# Patient Record
Sex: MALE | Race: WHITE | NOT HISPANIC OR LATINO
[De-identification: names, ages, dates, MRNs, and addresses within clinical notes are randomized per-mention and may not be internally consistent; named-entity substitution may affect disease eponyms.]

---

## 2018-01-19 ENCOUNTER — TRANSCRIPTION ENCOUNTER (OUTPATIENT)
Age: 63
End: 2018-01-19

## 2018-02-06 ENCOUNTER — APPOINTMENT (OUTPATIENT)
Dept: INTERNAL MEDICINE | Facility: CLINIC | Age: 63
End: 2018-02-06
Payer: MEDICAID

## 2018-02-06 ENCOUNTER — NON-APPOINTMENT (OUTPATIENT)
Age: 63
End: 2018-02-06

## 2018-02-06 VITALS
DIASTOLIC BLOOD PRESSURE: 100 MMHG | OXYGEN SATURATION: 97 % | TEMPERATURE: 98.2 F | SYSTOLIC BLOOD PRESSURE: 160 MMHG | WEIGHT: 307 LBS | HEIGHT: 71 IN | BODY MASS INDEX: 42.98 KG/M2 | HEART RATE: 97 BPM

## 2018-02-06 VITALS — SYSTOLIC BLOOD PRESSURE: 180 MMHG | DIASTOLIC BLOOD PRESSURE: 106 MMHG

## 2018-02-06 DIAGNOSIS — Z56.0 UNEMPLOYMENT, UNSPECIFIED: ICD-10-CM

## 2018-02-06 DIAGNOSIS — Z86.79 PERSONAL HISTORY OF OTHER DISEASES OF THE CIRCULATORY SYSTEM: ICD-10-CM

## 2018-02-06 DIAGNOSIS — Z83.3 FAMILY HISTORY OF DIABETES MELLITUS: ICD-10-CM

## 2018-02-06 DIAGNOSIS — Z11.3 ENCOUNTER FOR SCREENING FOR INFECTIONS WITH A PREDOMINANTLY SEXUAL MODE OF TRANSMISSION: ICD-10-CM

## 2018-02-06 DIAGNOSIS — Z82.49 FAMILY HISTORY OF ISCHEMIC HEART DISEASE AND OTHER DISEASES OF THE CIRCULATORY SYSTEM: ICD-10-CM

## 2018-02-06 DIAGNOSIS — L30.9 DERMATITIS, UNSPECIFIED: ICD-10-CM

## 2018-02-06 PROCEDURE — 93000 ELECTROCARDIOGRAM COMPLETE: CPT

## 2018-02-06 PROCEDURE — 36415 COLL VENOUS BLD VENIPUNCTURE: CPT

## 2018-02-06 PROCEDURE — 99386 PREV VISIT NEW AGE 40-64: CPT | Mod: 25

## 2018-02-06 SDOH — ECONOMIC STABILITY - INCOME SECURITY: UNEMPLOYMENT, UNSPECIFIED: Z56.0

## 2018-02-07 ENCOUNTER — TRANSCRIPTION ENCOUNTER (OUTPATIENT)
Age: 63
End: 2018-02-07

## 2018-02-21 ENCOUNTER — APPOINTMENT (OUTPATIENT)
Dept: INTERNAL MEDICINE | Facility: CLINIC | Age: 63
End: 2018-02-21
Payer: MEDICAID

## 2018-02-21 VITALS — SYSTOLIC BLOOD PRESSURE: 132 MMHG | DIASTOLIC BLOOD PRESSURE: 82 MMHG

## 2018-02-21 VITALS
TEMPERATURE: 100.7 F | SYSTOLIC BLOOD PRESSURE: 110 MMHG | OXYGEN SATURATION: 96 % | BODY MASS INDEX: 42.98 KG/M2 | DIASTOLIC BLOOD PRESSURE: 80 MMHG | HEIGHT: 71 IN | HEART RATE: 106 BPM | WEIGHT: 307 LBS

## 2018-02-21 LAB
ALBUMIN SERPL ELPH-MCNC: 4.2 G/DL
ALP BLD-CCNC: 66 U/L
ALT SERPL-CCNC: 26 U/L
ANION GAP SERPL CALC-SCNC: 19 MMOL/L
APPEARANCE: CLEAR
AST SERPL-CCNC: 29 U/L
BASOPHILS # BLD AUTO: 0.03 K/UL
BASOPHILS NFR BLD AUTO: 0.3 %
BILIRUB SERPL-MCNC: 0.5 MG/DL
BILIRUBIN URINE: NEGATIVE
BLOOD URINE: NEGATIVE
BUN SERPL-MCNC: 14 MG/DL
C TRACH RRNA SPEC QL NAA+PROBE: NOT DETECTED
CALCIUM SERPL-MCNC: 9.6 MG/DL
CHLORIDE SERPL-SCNC: 103 MMOL/L
CHOLEST SERPL-MCNC: 230 MG/DL
CHOLEST/HDLC SERPL: 6.6 RATIO
CO2 SERPL-SCNC: 19 MMOL/L
COLOR: YELLOW
CREAT SERPL-MCNC: 1.13 MG/DL
EOSINOPHIL # BLD AUTO: 0.12 K/UL
EOSINOPHIL NFR BLD AUTO: 1.2 %
GLUCOSE QUALITATIVE U: NEGATIVE MG/DL
GLUCOSE SERPL-MCNC: 124 MG/DL
HBA1C MFR BLD HPLC: 6.6 %
HCT VFR BLD CALC: 46.5 %
HCV AB SER QL: NONREACTIVE
HCV S/CO RATIO: 0.1 S/CO
HDLC SERPL-MCNC: 35 MG/DL
HGB BLD-MCNC: 15 G/DL
HIV1+2 AB SPEC QL IA.RAPID: NONREACTIVE
IMM GRANULOCYTES NFR BLD AUTO: 0.3 %
KETONES URINE: ABNORMAL
LDLC SERPL CALC-MCNC: 170 MG/DL
LEUKOCYTE ESTERASE URINE: NEGATIVE
LYMPHOCYTES # BLD AUTO: 3.09 K/UL
LYMPHOCYTES NFR BLD AUTO: 31.6 %
MAN DIFF?: NORMAL
MCHC RBC-ENTMCNC: 27.8 PG
MCHC RBC-ENTMCNC: 32.3 GM/DL
MCV RBC AUTO: 86.3 FL
MONOCYTES # BLD AUTO: 0.69 K/UL
MONOCYTES NFR BLD AUTO: 7 %
N GONORRHOEA RRNA SPEC QL NAA+PROBE: NOT DETECTED
NEUTROPHILS # BLD AUTO: 5.83 K/UL
NEUTROPHILS NFR BLD AUTO: 59.6 %
NITRITE URINE: NEGATIVE
PH URINE: 5
PLATELET # BLD AUTO: 394 K/UL
POTASSIUM SERPL-SCNC: 4.2 MMOL/L
PROT SERPL-MCNC: 7.5 G/DL
PROTEIN URINE: NEGATIVE MG/DL
PSA SERPL-MCNC: 9.17 NG/ML
RBC # BLD: 5.39 M/UL
RBC # FLD: 14.6 %
SODIUM SERPL-SCNC: 141 MMOL/L
SOURCE AMPLIFICATION: NORMAL
SPECIFIC GRAVITY URINE: 1.02
T PALLIDUM AB SER QL IA: NEGATIVE
T4 FREE SERPL-MCNC: 1.3 NG/DL
TRIGL SERPL-MCNC: 123 MG/DL
TSH SERPL-ACNC: 3.28 UIU/ML
UROBILINOGEN URINE: 1 MG/DL
WBC # FLD AUTO: 9.79 K/UL

## 2018-02-21 PROCEDURE — 99214 OFFICE O/P EST MOD 30 MIN: CPT

## 2018-02-22 LAB
FLUAV H1 2009 PAND RNA SPEC QL NAA+PROBE: DETECTED
RAPID RVP RESULT: DETECTED

## 2018-04-19 ENCOUNTER — APPOINTMENT (OUTPATIENT)
Dept: UROLOGY | Facility: CLINIC | Age: 63
End: 2018-04-19

## 2018-04-23 ENCOUNTER — APPOINTMENT (OUTPATIENT)
Dept: INTERNAL MEDICINE | Facility: CLINIC | Age: 63
End: 2018-04-23
Payer: MEDICAID

## 2018-04-23 VITALS — DIASTOLIC BLOOD PRESSURE: 100 MMHG | SYSTOLIC BLOOD PRESSURE: 146 MMHG

## 2018-04-23 VITALS
SYSTOLIC BLOOD PRESSURE: 140 MMHG | HEIGHT: 70.5 IN | HEART RATE: 107 BPM | DIASTOLIC BLOOD PRESSURE: 100 MMHG | WEIGHT: 300 LBS | OXYGEN SATURATION: 98 % | BODY MASS INDEX: 42.47 KG/M2 | TEMPERATURE: 98.3 F

## 2018-04-23 PROCEDURE — 99214 OFFICE O/P EST MOD 30 MIN: CPT | Mod: 25

## 2018-04-23 PROCEDURE — 36415 COLL VENOUS BLD VENIPUNCTURE: CPT

## 2018-04-23 RX ORDER — OSELTAMIVIR PHOSPHATE 75 MG/1
75 CAPSULE ORAL TWICE DAILY
Qty: 10 | Refills: 0 | Status: DISCONTINUED | COMMUNITY
Start: 2018-02-22 | End: 2018-04-23

## 2018-04-24 LAB
ALBUMIN SERPL ELPH-MCNC: 4.2 G/DL
ALP BLD-CCNC: 70 U/L
ALT SERPL-CCNC: 18 U/L
ANION GAP SERPL CALC-SCNC: 18 MMOL/L
AST SERPL-CCNC: 26 U/L
BILIRUB SERPL-MCNC: 0.4 MG/DL
BUN SERPL-MCNC: 20 MG/DL
CALCIUM SERPL-MCNC: 10 MG/DL
CHLORIDE SERPL-SCNC: 98 MMOL/L
CO2 SERPL-SCNC: 23 MMOL/L
CREAT SERPL-MCNC: 1.13 MG/DL
GLUCOSE SERPL-MCNC: 94 MG/DL
POTASSIUM SERPL-SCNC: 4.2 MMOL/L
PROT SERPL-MCNC: 7.6 G/DL
SODIUM SERPL-SCNC: 139 MMOL/L

## 2018-05-01 ENCOUNTER — APPOINTMENT (OUTPATIENT)
Dept: UROLOGY | Facility: CLINIC | Age: 63
End: 2018-05-01
Payer: MEDICAID

## 2018-05-01 PROCEDURE — 99204 OFFICE O/P NEW MOD 45 MIN: CPT

## 2018-05-03 LAB
APPEARANCE: CLEAR
BACTERIA: NEGATIVE
BILIRUBIN URINE: NEGATIVE
BLOOD URINE: NEGATIVE
COLOR: YELLOW
GLUCOSE QUALITATIVE U: 100 MG/DL
HYALINE CASTS: 3 /LPF
KETONES URINE: ABNORMAL
LEUKOCYTE ESTERASE URINE: NEGATIVE
MICROSCOPIC-UA: NORMAL
NITRITE URINE: NEGATIVE
PH URINE: 5
PROTEIN URINE: 30 MG/DL
RED BLOOD CELLS URINE: 2 /HPF
SPECIFIC GRAVITY URINE: 1.02
SQUAMOUS EPITHELIAL CELLS: 3 /HPF
UROBILINOGEN URINE: NEGATIVE MG/DL
WHITE BLOOD CELLS URINE: 2 /HPF

## 2018-05-17 LAB
PSA FREE FLD-MCNC: 17.7
PSA FREE SERPL-MCNC: 1.49 NG/ML
PSA SERPL-MCNC: 8.42 NG/ML

## 2018-06-19 LAB
HBA1C MFR BLD HPLC: 6.6 %
PSA SERPL-MCNC: 8.34 NG/ML

## 2018-08-07 ENCOUNTER — APPOINTMENT (OUTPATIENT)
Dept: UROLOGY | Facility: CLINIC | Age: 63
End: 2018-08-07

## 2018-08-13 ENCOUNTER — APPOINTMENT (OUTPATIENT)
Dept: INTERNAL MEDICINE | Facility: CLINIC | Age: 63
End: 2018-08-13
Payer: MEDICAID

## 2018-08-13 VITALS
OXYGEN SATURATION: 98 % | HEART RATE: 89 BPM | DIASTOLIC BLOOD PRESSURE: 70 MMHG | TEMPERATURE: 97.9 F | SYSTOLIC BLOOD PRESSURE: 120 MMHG | HEIGHT: 70.5 IN | WEIGHT: 293 LBS | BODY MASS INDEX: 41.48 KG/M2

## 2018-08-13 DIAGNOSIS — J06.9 ACUTE UPPER RESPIRATORY INFECTION, UNSPECIFIED: ICD-10-CM

## 2018-08-13 DIAGNOSIS — J10.1 INFLUENZA DUE TO OTHER IDENTIFIED INFLUENZA VIRUS WITH OTHER RESPIRATORY MANIFESTATIONS: ICD-10-CM

## 2018-08-13 PROCEDURE — 99214 OFFICE O/P EST MOD 30 MIN: CPT

## 2018-08-13 NOTE — ASSESSMENT
[FreeTextEntry1] : discussed w pt \par \par cont current rx \par HTN now well controlled\par \par cont diet control, exercise and efforts at weight loss \par check repeat labs as below today , monitor HgBa1c \par \par cont to monitor PSA, urology f/u as scheduled \par \par RTO 4 months for routine f/u or earlier prn if any new concerns

## 2018-08-13 NOTE — PHYSICAL EXAM
[No Acute Distress] : no acute distress [Well-Appearing] : well-appearing [Normal Voice/Communication] : normal voice/communication [No JVD] : no jugular venous distention [Supple] : supple [No Respiratory Distress] : no respiratory distress  [Clear to Auscultation] : lungs were clear to auscultation bilaterally [No Accessory Muscle Use] : no accessory muscle use [Normal Rate] : normal rate  [Regular Rhythm] : with a regular rhythm [Normal S1, S2] : normal S1 and S2 [No Murmur] : no murmur heard [Grossly Normal Strength/Tone] : grossly normal strength/tone [Normal Gait] : normal gait [Normal Affect] : the affect was normal [Normal Mood] : the mood was normal [Normal Insight/Judgement] : insight and judgment were intact [de-identified] : obese

## 2018-08-13 NOTE — HISTORY OF PRESENT ILLNESS
[de-identified] : presents for f/u visit to review HTN control, weight management and IFG/mild DM. he feels well w no new concerns. he continues to achieve mild weight loss and feels much better overall, more active. \par \par HTN now well controlled on current rx \par obesity improving\par mild DM/IFG on diet control, he has changed diet significantly, due for repeat labs \par mild hyperlipidemia on diet control \par PSA elevation stable, now following w urology and plans to continue to monitor for now, due for repeat PSA

## 2018-12-10 ENCOUNTER — APPOINTMENT (OUTPATIENT)
Dept: INTERNAL MEDICINE | Facility: CLINIC | Age: 63
End: 2018-12-10
Payer: MEDICAID

## 2018-12-10 VITALS
WEIGHT: 281 LBS | TEMPERATURE: 98.1 F | SYSTOLIC BLOOD PRESSURE: 122 MMHG | HEIGHT: 71.5 IN | DIASTOLIC BLOOD PRESSURE: 84 MMHG | OXYGEN SATURATION: 98 % | BODY MASS INDEX: 38.48 KG/M2 | HEART RATE: 79 BPM

## 2018-12-10 DIAGNOSIS — Z23 ENCOUNTER FOR IMMUNIZATION: ICD-10-CM

## 2018-12-10 LAB
ALBUMIN SERPL ELPH-MCNC: 4.2 G/DL
ALP BLD-CCNC: 53 U/L
ALT SERPL-CCNC: 19 U/L
ANION GAP SERPL CALC-SCNC: 15 MMOL/L
AST SERPL-CCNC: 21 U/L
BILIRUB SERPL-MCNC: 0.5 MG/DL
BUN SERPL-MCNC: 20 MG/DL
CALCIUM SERPL-MCNC: 9.3 MG/DL
CHLORIDE SERPL-SCNC: 100 MMOL/L
CHOLEST SERPL-MCNC: 194 MG/DL
CHOLEST/HDLC SERPL: 5.7 RATIO
CO2 SERPL-SCNC: 24 MMOL/L
CREAT SERPL-MCNC: 1.04 MG/DL
GLUCOSE SERPL-MCNC: 151 MG/DL
HBA1C MFR BLD HPLC: 6.6 %
HDLC SERPL-MCNC: 34 MG/DL
LDLC SERPL CALC-MCNC: 124 MG/DL
POTASSIUM SERPL-SCNC: 4.1 MMOL/L
PROT SERPL-MCNC: 6.9 G/DL
PSA SERPL-MCNC: 10.92 NG/ML
SODIUM SERPL-SCNC: 139 MMOL/L
TRIGL SERPL-MCNC: 179 MG/DL

## 2018-12-10 PROCEDURE — G0008: CPT

## 2018-12-10 PROCEDURE — 99214 OFFICE O/P EST MOD 30 MIN: CPT | Mod: 25

## 2018-12-10 PROCEDURE — 90674 CCIIV4 VAC NO PRSV 0.5 ML IM: CPT

## 2018-12-10 PROCEDURE — 36415 COLL VENOUS BLD VENIPUNCTURE: CPT

## 2018-12-10 NOTE — PHYSICAL EXAM
[No Acute Distress] : no acute distress [Well-Appearing] : well-appearing [Normal Voice/Communication] : normal voice/communication [No Respiratory Distress] : no respiratory distress  [Clear to Auscultation] : lungs were clear to auscultation bilaterally [No Accessory Muscle Use] : no accessory muscle use [Normal Rate] : normal rate  [Regular Rhythm] : with a regular rhythm [Normal S1, S2] : normal S1 and S2 [No Murmur] : no murmur heard [No Carotid Bruits] : no carotid bruits [Pedal Pulses Present] : the pedal pulses are present [No Edema] : there was no peripheral edema [Grossly Normal Strength/Tone] : grossly normal strength/tone [Normal Gait] : normal gait [Normal Affect] : the affect was normal [Normal Mood] : the mood was normal [Normal Insight/Judgement] : insight and judgment were intact [de-identified] : obese

## 2018-12-10 NOTE — ASSESSMENT
[FreeTextEntry1] : discussed w pt \par \par cont diet control, exercise, low carb intake, weight loss successful. monitor glycemic control. \par cont current rx for HTN which is well controlled\par \par check labs as below today \par \par monitor PSa w stable elevation, BPH. due for f/u w urology, he will make appt \par \par influenza vaccine discussed and administered today\par \par RTO 6 months for full physical or earlier prn if any new concerns

## 2018-12-10 NOTE — HISTORY OF PRESENT ILLNESS
[de-identified] : presents for f/u visit to review HTN control, weight management and IFG/mild DM. he feels well w no new concerns. he would like to discuss influenza vaccine as he had influenza B last year, has always declined the vaccine. \par \par he continues to lose weight, about 10 lbs w diet, exercise. has lost ~ 30 lbs since last year when he first came here to manage his health. \par \par HTN well controlled on current rx \par obesity improving\par mild DM/IFG on diet control stable A1c 6.6%, he has changed diet significantly\par mild hyperlipidemia on diet control , overall improved \par PSA elevation stable w BPH,  following w urology due for f/u visit and to monitor PSA

## 2018-12-11 LAB
ALBUMIN SERPL ELPH-MCNC: 4.3 G/DL
ALP BLD-CCNC: 49 U/L
ALT SERPL-CCNC: 17 U/L
ANION GAP SERPL CALC-SCNC: 14 MMOL/L
AST SERPL-CCNC: 21 U/L
BILIRUB SERPL-MCNC: 0.5 MG/DL
BUN SERPL-MCNC: 14 MG/DL
CALCIUM SERPL-MCNC: 9.5 MG/DL
CHLORIDE SERPL-SCNC: 102 MMOL/L
CHOLEST SERPL-MCNC: 177 MG/DL
CHOLEST/HDLC SERPL: 5.7 RATIO
CO2 SERPL-SCNC: 25 MMOL/L
CREAT SERPL-MCNC: 1.54 MG/DL
GLUCOSE SERPL-MCNC: 143 MG/DL
HBA1C MFR BLD HPLC: 6.4 %
HDLC SERPL-MCNC: 31 MG/DL
LDLC SERPL CALC-MCNC: 114 MG/DL
POTASSIUM SERPL-SCNC: 3.9 MMOL/L
PROT SERPL-MCNC: 7.5 G/DL
PSA SERPL-MCNC: 20.78 NG/ML
SODIUM SERPL-SCNC: 141 MMOL/L
T4 FREE SERPL-MCNC: 1.4 NG/DL
TRIGL SERPL-MCNC: 160 MG/DL
TSH SERPL-ACNC: 2.78 UIU/ML

## 2019-04-30 ENCOUNTER — APPOINTMENT (OUTPATIENT)
Dept: UROLOGY | Facility: CLINIC | Age: 64
End: 2019-04-30
Payer: MEDICAID

## 2019-04-30 PROCEDURE — 99214 OFFICE O/P EST MOD 30 MIN: CPT

## 2019-05-01 ENCOUNTER — TRANSCRIPTION ENCOUNTER (OUTPATIENT)
Age: 64
End: 2019-05-01

## 2019-05-01 LAB
APPEARANCE: ABNORMAL
BACTERIA: NEGATIVE
BILIRUBIN URINE: NEGATIVE
BLOOD URINE: NEGATIVE
COLOR: YELLOW
GLUCOSE QUALITATIVE U: NEGATIVE
HYALINE CASTS: 0 /LPF
KETONES URINE: NEGATIVE
LEUKOCYTE ESTERASE URINE: NEGATIVE
MICROSCOPIC-UA: NORMAL
NITRITE URINE: NEGATIVE
PH URINE: 6
PROTEIN URINE: ABNORMAL
PSA FREE FLD-MCNC: 23 %
PSA FREE SERPL-MCNC: 1.96 NG/ML
PSA SERPL-MCNC: 8.36 NG/ML
RED BLOOD CELLS URINE: 1 /HPF
SPECIFIC GRAVITY URINE: 1.02
SQUAMOUS EPITHELIAL CELLS: 3 /HPF
UROBILINOGEN URINE: NORMAL
WHITE BLOOD CELLS URINE: 3 /HPF

## 2019-06-03 ENCOUNTER — APPOINTMENT (OUTPATIENT)
Dept: INTERNAL MEDICINE | Facility: CLINIC | Age: 64
End: 2019-06-03
Payer: MEDICAID

## 2019-06-03 ENCOUNTER — NON-APPOINTMENT (OUTPATIENT)
Age: 64
End: 2019-06-03

## 2019-06-03 VITALS
HEART RATE: 64 BPM | DIASTOLIC BLOOD PRESSURE: 74 MMHG | SYSTOLIC BLOOD PRESSURE: 126 MMHG | OXYGEN SATURATION: 97 % | BODY MASS INDEX: 35.25 KG/M2 | HEIGHT: 70.5 IN | WEIGHT: 249 LBS | TEMPERATURE: 97.9 F

## 2019-06-03 DIAGNOSIS — Z23 ENCOUNTER FOR IMMUNIZATION: ICD-10-CM

## 2019-06-03 PROCEDURE — 90750 HZV VACC RECOMBINANT IM: CPT

## 2019-06-03 PROCEDURE — G0444 DEPRESSION SCREEN ANNUAL: CPT

## 2019-06-03 PROCEDURE — 36415 COLL VENOUS BLD VENIPUNCTURE: CPT

## 2019-06-03 PROCEDURE — 90471 IMMUNIZATION ADMIN: CPT

## 2019-06-03 PROCEDURE — 99396 PREV VISIT EST AGE 40-64: CPT | Mod: 25

## 2019-06-03 PROCEDURE — 93000 ELECTROCARDIOGRAM COMPLETE: CPT

## 2019-06-03 NOTE — REVIEW OF SYSTEMS
Focal left SFA stenosis. Three vessel tibial runoff; PT and peroneal patent to foot, minimal AT stenoses.
[Negative] : Heme/Lymph

## 2019-06-03 NOTE — HEALTH RISK ASSESSMENT
[No falls in past year] : Patient reported no falls in the past year [0] : 2) Feeling down, depressed, or hopeless: Not at all (0) [Single] : single [None] : None [] : No [ColonoscopyDate] : 2007

## 2019-06-03 NOTE — ASSESSMENT
[FreeTextEntry1] : discussed w pt \par \par check routine labs as below\par \par recent PSA reviewed, baseline range seems to be 8-9 range, following w Dr Diop, to be reevaluate in 6 months \par \par discussed further efforts at diet control, weight loss, he has been very successful thus far, cont low carb intake and exercise\par \par cont current rx for HTN\par \par discussed Shingrix vaccine in detail, first dose administered today, return for 2nd dose in 2-6 months \par \par consider repeat colonoscopy in coming year now that his HTN is under control, will review at next visit in 6 months , return earlier prn if any new concerns

## 2019-06-03 NOTE — HISTORY OF PRESENT ILLNESS
Arterial Line    Patient location during procedure: OR  Stop Time:6/7/2017 11:30 AM       Line placed for hemodynamic monitoring.  Performed By   CRNA: MARYCHUY WOODWARD  Preanesthetic Checklist  Completed: patient identified, site marked, surgical consent, pre-op evaluation, timeout performed, IV checked, risks and benefits discussed and monitors and equipment checked  Arterial Line Prep   Sterile Tech: cap, gloves and mask  Prep: ChloraPrep  Patient monitoring: blood pressure monitoring, continuous pulse oximetry and EKG  Arterial Line Procedure   Laterality:left  Location:  radial artery  Catheter size: 20 G   Guidance: landmark technique and palpation technique  Number of attempts: 2  Successful placement: yes          Post Assessment   Dressing Type: occlusive dressing applied, secured with tape and wrist guard applied.   Complications no  Circ/Move/Sens Assessment: normal.   Patient Tolerance: patient tolerated the procedure well with no apparent complications             [de-identified] : 63 y/o man presents for annual physical exam. he feels well currently , no new concerns. \par \par obesity s/p gastric bypass has improved greatly with diet control, exercise, has lost over 50 lbs since last year. he feels his back and joint pains are significantly improved. \par IFG on diet control \par HTN well controlled on current rx \par PSA elevation is monitored closely, following w Dr Diop, improved back to baseline 8-9 range after elevation. plan to continue to monitor every 6 months \par \par he recalls his last colonoscopy was around age 52 and normal as per pt, will need to consider repeat in the near future

## 2019-06-03 NOTE — COUNSELING
[Weight management counseling provided] : Weight management [Healthy eating counseling provided] : healthy eating [ - Annual Depression Screening] : Annual Depression Screening [Activity counseling provided] : activity

## 2019-06-03 NOTE — PHYSICAL EXAM
[Well Nourished] : well nourished [No Acute Distress] : no acute distress [Well Developed] : well developed [Normal Voice/Communication] : normal voice/communication [Normal Sclera/Conjunctiva] : normal sclera/conjunctiva [Well-Appearing] : well-appearing [Normal Outer Ear/Nose] : the outer ears and nose were normal in appearance [PERRL] : pupils equal round and reactive to light [Normal Oropharynx] : the oropharynx was normal [Normal TMs] : both tympanic membranes were normal [No JVD] : no jugular venous distention [Supple] : supple [No Lymphadenopathy] : no lymphadenopathy [No Respiratory Distress] : no respiratory distress  [Thyroid Normal, No Nodules] : the thyroid was normal and there were no nodules present [Clear to Auscultation] : lungs were clear to auscultation bilaterally [Regular Rhythm] : with a regular rhythm [No Accessory Muscle Use] : no accessory muscle use [Normal Rate] : normal rate  [Normal S1, S2] : normal S1 and S2 [No Carotid Bruits] : no carotid bruits [No Murmur] : no murmur heard [No Varicosities] : no varicosities [No Abdominal Bruit] : a ~M bruit was not heard ~T in the abdomen [No Edema] : there was no peripheral edema [No Extremity Clubbing/Cyanosis] : no extremity clubbing/cyanosis [Pedal Pulses Present] : the pedal pulses are present [Soft] : abdomen soft [Non Tender] : non-tender [No HSM] : no HSM [Non-distended] : non-distended [No Masses] : no abdominal mass palpated [Normal Bowel Sounds] : normal bowel sounds [Normal Supraclavicular Nodes] : no supraclavicular lymphadenopathy [Declined Rectal Exam] : declined rectal exam [Normal Posterior Cervical Nodes] : no posterior cervical lymphadenopathy [Normal Anterior Cervical Nodes] : no anterior cervical lymphadenopathy [No Joint Swelling] : no joint swelling [No Spinal Tenderness] : no spinal tenderness [Grossly Normal Strength/Tone] : grossly normal strength/tone [Normal Gait] : normal gait [No Rash] : no rash [No Focal Deficits] : no focal deficits [Coordination Grossly Intact] : coordination grossly intact [Normal Affect] : the affect was normal [Speech Grossly Normal] : speech grossly normal [Normal Insight/Judgement] : insight and judgment were intact [Normal Mood] : the mood was normal [de-identified] : obese  [FreeTextEntry1] : done recently w urologist

## 2019-08-27 ENCOUNTER — APPOINTMENT (OUTPATIENT)
Dept: INTERNAL MEDICINE | Facility: CLINIC | Age: 64
End: 2019-08-27
Payer: MEDICAID

## 2019-08-27 PROCEDURE — 90471 IMMUNIZATION ADMIN: CPT

## 2019-08-27 PROCEDURE — 90750 HZV VACC RECOMBINANT IM: CPT

## 2019-09-03 LAB
ALBUMIN SERPL ELPH-MCNC: 4.3 G/DL
ALP BLD-CCNC: 48 U/L
ALT SERPL-CCNC: 20 U/L
ANION GAP SERPL CALC-SCNC: 14 MMOL/L
APPEARANCE: CLEAR
AST SERPL-CCNC: 25 U/L
BASOPHILS # BLD AUTO: 0.05 K/UL
BASOPHILS NFR BLD AUTO: 0.5 %
BILIRUB SERPL-MCNC: 0.6 MG/DL
BILIRUBIN URINE: NEGATIVE
BLOOD URINE: NEGATIVE
BUN SERPL-MCNC: 15 MG/DL
CALCIUM SERPL-MCNC: 9.8 MG/DL
CHLORIDE SERPL-SCNC: 103 MMOL/L
CHOLEST SERPL-MCNC: 208 MG/DL
CHOLEST/HDLC SERPL: 5.9 RATIO
CO2 SERPL-SCNC: 24 MMOL/L
COLOR: YELLOW
CREAT SERPL-MCNC: 1.22 MG/DL
EOSINOPHIL # BLD AUTO: 0.13 K/UL
EOSINOPHIL NFR BLD AUTO: 1.4 %
ESTIMATED AVERAGE GLUCOSE: 137 MG/DL
GLUCOSE QUALITATIVE U: NEGATIVE
GLUCOSE SERPL-MCNC: 110 MG/DL
HBA1C MFR BLD HPLC: 6.4 %
HCT VFR BLD CALC: 49.6 %
HDLC SERPL-MCNC: 35 MG/DL
HGB BLD-MCNC: 15.4 G/DL
IMM GRANULOCYTES NFR BLD AUTO: 0.3 %
KETONES URINE: NEGATIVE
LDLC SERPL CALC-MCNC: 143 MG/DL
LEUKOCYTE ESTERASE URINE: NEGATIVE
LYMPHOCYTES # BLD AUTO: 2.52 K/UL
LYMPHOCYTES NFR BLD AUTO: 26.8 %
MAN DIFF?: NORMAL
MCHC RBC-ENTMCNC: 28.6 PG
MCHC RBC-ENTMCNC: 31 GM/DL
MCV RBC AUTO: 92.2 FL
MONOCYTES # BLD AUTO: 0.6 K/UL
MONOCYTES NFR BLD AUTO: 6.4 %
NEUTROPHILS # BLD AUTO: 6.08 K/UL
NEUTROPHILS NFR BLD AUTO: 64.6 %
NITRITE URINE: NEGATIVE
PH URINE: 6.5
PLATELET # BLD AUTO: 378 K/UL
POTASSIUM SERPL-SCNC: 4.5 MMOL/L
PROT SERPL-MCNC: 6.7 G/DL
PROTEIN URINE: NEGATIVE
RBC # BLD: 5.38 M/UL
RBC # FLD: 15.3 %
SODIUM SERPL-SCNC: 141 MMOL/L
SPECIFIC GRAVITY URINE: 1.02
T4 FREE SERPL-MCNC: 1.2 NG/DL
TRIGL SERPL-MCNC: 148 MG/DL
TSH SERPL-ACNC: 1.86 UIU/ML
UROBILINOGEN URINE: NORMAL
WBC # FLD AUTO: 9.41 K/UL

## 2019-09-26 ENCOUNTER — RX RENEWAL (OUTPATIENT)
Age: 64
End: 2019-09-26

## 2019-11-12 ENCOUNTER — APPOINTMENT (OUTPATIENT)
Dept: UROLOGY | Facility: CLINIC | Age: 64
End: 2019-11-12
Payer: MEDICAID

## 2019-11-12 DIAGNOSIS — R35.0 FREQUENCY OF MICTURITION: ICD-10-CM

## 2019-11-12 DIAGNOSIS — R97.20 ELEVATED PROSTATE, SPECIFIC ANTIGEN [PSA]: ICD-10-CM

## 2019-11-12 PROCEDURE — 99214 OFFICE O/P EST MOD 30 MIN: CPT

## 2019-11-12 NOTE — PHYSICAL EXAM
[General Appearance - Well Developed] : well developed [General Appearance - Well Nourished] : well nourished [General Appearance - In No Acute Distress] : no acute distress [Well Groomed] : well groomed [Normal Appearance] : normal appearance [Abdomen Soft] : soft [Abdomen Tenderness] : non-tender [Urethral Meatus] : meatus normal [Urinary Bladder Findings] : the bladder was normal on palpation [Costovertebral Angle Tenderness] : no ~M costovertebral angle tenderness [Testes Mass (___cm)] : there were no testicular masses [No Prostate Nodules] : no prostate nodules [Scrotum] : the scrotum was normal [Edema] : no peripheral edema [Exaggerated Use Of Accessory Muscles For Inspiration] : no accessory muscle use [] : no respiratory distress [Respiration, Rhythm And Depth] : normal respiratory rhythm and effort [Affect] : the affect was normal [Not Anxious] : not anxious [Oriented To Time, Place, And Person] : oriented to person, place, and time [Mood] : the mood was normal [No Palpable Adenopathy] : no palpable adenopathy [Normal Station and Gait] : the gait and station were normal for the patient's age [No Focal Deficits] : no focal deficits

## 2019-11-13 LAB
APPEARANCE: CLEAR
BACTERIA: NEGATIVE
BILIRUBIN URINE: NEGATIVE
BLOOD URINE: NEGATIVE
COLOR: YELLOW
GLUCOSE QUALITATIVE U: NEGATIVE
HYALINE CASTS: 2 /LPF
KETONES URINE: NEGATIVE
LEUKOCYTE ESTERASE URINE: NEGATIVE
MICROSCOPIC-UA: NORMAL
NITRITE URINE: NEGATIVE
PH URINE: 6
PROTEIN URINE: NORMAL
PSA FREE FLD-MCNC: 17 %
PSA FREE SERPL-MCNC: 1.58 NG/ML
PSA SERPL-MCNC: 9.47 NG/ML
RED BLOOD CELLS URINE: 1 /HPF
SPECIFIC GRAVITY URINE: 1.02
SQUAMOUS EPITHELIAL CELLS: 1 /HPF
UROBILINOGEN URINE: ABNORMAL
WHITE BLOOD CELLS URINE: 1 /HPF

## 2019-12-09 ENCOUNTER — APPOINTMENT (OUTPATIENT)
Dept: INTERNAL MEDICINE | Facility: CLINIC | Age: 64
End: 2019-12-09
Payer: MEDICAID

## 2019-12-09 VITALS
OXYGEN SATURATION: 98 % | WEIGHT: 236 LBS | DIASTOLIC BLOOD PRESSURE: 70 MMHG | HEART RATE: 66 BPM | HEIGHT: 70.5 IN | TEMPERATURE: 98.3 F | SYSTOLIC BLOOD PRESSURE: 110 MMHG | BODY MASS INDEX: 33.41 KG/M2

## 2019-12-09 DIAGNOSIS — R97.20 ELEVATED PROSTATE, SPECIFIC ANTIGEN [PSA]: ICD-10-CM

## 2019-12-09 PROCEDURE — 99214 OFFICE O/P EST MOD 30 MIN: CPT | Mod: 25

## 2019-12-09 PROCEDURE — G0008: CPT

## 2019-12-09 PROCEDURE — 90686 IIV4 VACC NO PRSV 0.5 ML IM: CPT

## 2019-12-09 PROCEDURE — 36415 COLL VENOUS BLD VENIPUNCTURE: CPT

## 2019-12-09 PROCEDURE — 83036 HEMOGLOBIN GLYCOSYLATED A1C: CPT | Mod: QW

## 2019-12-09 NOTE — HISTORY OF PRESENT ILLNESS
[de-identified] : presents for routine f/u visit, he feels well currently , no new concerns. continues to lose weight effectively w diet control. s/p gastric bypass in the past. exercising mildly. \par \par IFG on diet control \par HTN well controlled on current rx \par PSA elevation is monitored closely, following w Dr Diop, stable in 8-9 range, mild BPH symptoms , advised to cont monitoring

## 2019-12-09 NOTE — ASSESSMENT
[FreeTextEntry1] : discussed w pt \par \par continued weight loss w diet control, exercise, he is feeling well \par \par POC HgbA1c improved to 5.7% today \par \par cont current rx for HTN\par \par check additional labs as below\par \par influenza vaccine discussed and administered today\par \par continued urology f/u for monitoring of elevated BPH w elevated PSA \par \par RTO 6 months or earlier prn if any new concerns

## 2020-01-14 LAB
ALBUMIN SERPL ELPH-MCNC: 4.4 G/DL
ALP BLD-CCNC: 43 U/L
ALT SERPL-CCNC: 15 U/L
ANION GAP SERPL CALC-SCNC: 10 MMOL/L
AST SERPL-CCNC: 20 U/L
BILIRUB SERPL-MCNC: 0.5 MG/DL
BUN SERPL-MCNC: 18 MG/DL
CALCIUM SERPL-MCNC: 9.6 MG/DL
CHLORIDE SERPL-SCNC: 104 MMOL/L
CHOLEST SERPL-MCNC: 186 MG/DL
CHOLEST/HDLC SERPL: 3.4 RATIO
CO2 SERPL-SCNC: 27 MMOL/L
CREAT SERPL-MCNC: 1.08 MG/DL
GLUCOSE SERPL-MCNC: 107 MG/DL
HDLC SERPL-MCNC: 55 MG/DL
LDLC SERPL CALC-MCNC: 107 MG/DL
POTASSIUM SERPL-SCNC: 4.3 MMOL/L
PROT SERPL-MCNC: 6.6 G/DL
SODIUM SERPL-SCNC: 141 MMOL/L
TRIGL SERPL-MCNC: 122 MG/DL

## 2020-06-08 ENCOUNTER — RX RENEWAL (OUTPATIENT)
Age: 65
End: 2020-06-08

## 2020-07-07 ENCOUNTER — APPOINTMENT (OUTPATIENT)
Dept: UROLOGY | Facility: CLINIC | Age: 65
End: 2020-07-07

## 2020-11-04 ENCOUNTER — APPOINTMENT (OUTPATIENT)
Dept: INTERNAL MEDICINE | Facility: CLINIC | Age: 65
End: 2020-11-04

## 2021-03-21 ENCOUNTER — INPATIENT (INPATIENT)
Facility: HOSPITAL | Age: 66
LOS: 14 days | Discharge: HOME | DRG: 871 | End: 2021-04-05
Attending: HOSPITALIST | Admitting: HOSPITALIST
Payer: MEDICAID

## 2021-03-21 VITALS
RESPIRATION RATE: 25 BRPM | SYSTOLIC BLOOD PRESSURE: 116 MMHG | DIASTOLIC BLOOD PRESSURE: 78 MMHG | OXYGEN SATURATION: 84 % | HEART RATE: 87 BPM | TEMPERATURE: 98 F

## 2021-03-21 LAB — GAS PNL BLDV: SIGNIFICANT CHANGE UP

## 2021-03-21 PROCEDURE — 99285 EMERGENCY DEPT VISIT HI MDM: CPT

## 2021-03-21 PROCEDURE — 93010 ELECTROCARDIOGRAM REPORT: CPT

## 2021-03-21 RX ORDER — ACETAMINOPHEN 500 MG
975 TABLET ORAL ONCE
Refills: 0 | Status: COMPLETED | OUTPATIENT
Start: 2021-03-21 | End: 2021-03-21

## 2021-03-21 NOTE — ED ADULT NURSE NOTE - OBJECTIVE STATEMENT
Pt is a 64 y/o M, PMH HTN, presenting to ED c/o fevers/SOB/cough x 1 week. Pt states he had his first dose of the COVID vaccine on 3/3. Pt endorses fevers at home, unmeasured, but states he took Aleve when he felt himself start to get warm, last dose 1 hour PTA. Pt states his SOB is worse on exertion and at night. Pt SpO2 noted to be 84% on RA, improvement to 100% on 10L NRB. Pt noted to be tachypneic to 30RR with increased WOB and moderate diaphoresis.  Pt denies headache, dizziness, chest pain, palpitations, abdominal pain, n/v/d, urinary symptoms, chills, weakness at this time. Pt is A&Ox4, moves all extremities, changed into hospital gown on [facility] with call bell at bedside and safety maintained.

## 2021-03-21 NOTE — ED PROVIDER NOTE - OBJECTIVE STATEMENT
Hx htn on "blood pressure med" presenting w. 9 days of sore throat now w. complaint of SOB, has had one COVID injection but not yet received his second vaccination, notes that he does not have pain anywhere, just a tickle in his throat, denies other sx of concern, no hx of DM/HLD/heart disease. Denies leg swelling, back pain, chest pain, or abdominal pain.

## 2021-03-21 NOTE — ED PROVIDER NOTE - CLINICAL SUMMARY MEDICAL DECISION MAKING FREE TEXT BOX
65M presenting for evaluation of sore throat and shortness of breath, on exam hypoxic to 84% in triage and with slowly improved O2 sat on NRB, no peripheral edema, denies pain, high suspicion for COVID infx will check labs, imaging, swab, tba for hypoxic resp failure. RIC Woo

## 2021-03-21 NOTE — ED ADULT NURSE NOTE - NSIMPLEMENTINTERV_GEN_ALL_ED
Implemented All Universal Safety Interventions:  [place] to call system. Call bell, personal items and telephone within reach. Instruct patient to call for assistance. Room bathroom lighting operational. Non-slip footwear when patient is off stretcher. Physically safe environment: no spills, clutter or unnecessary equipment. Stretcher in lowest position, wheels locked, appropriate side rails in place.

## 2021-03-21 NOTE — ED PROVIDER NOTE - PHYSICAL EXAMINATION
Gen: NAD, non-toxic, conversational  Eyes: PERRLA, EOMI   HENT: Normocephalic, atraumatic. External ears normal, no rhinorrhea, moist mucous membranes.   CV: RRR, no M/R/G  Resp: CTAB, non-labored, speaking without difficulty on non-rebreather, O2 sat in triage of 84% on RA, initial sat on NRB of 88% but improved to 100%   Abd: soft, non tender, non rigid, no guarding or rebound tenderness  Back: No CVAT bilaterally, no midline ttp  Skin: dry, wwp   Neuro: AOx3, speech is fluent and appropriate  Psych: Mood "okay", affect euthymic

## 2021-03-22 DIAGNOSIS — N17.9 ACUTE KIDNEY FAILURE, UNSPECIFIED: ICD-10-CM

## 2021-03-22 DIAGNOSIS — U07.1 COVID-19: ICD-10-CM

## 2021-03-22 DIAGNOSIS — R09.89 OTHER SPECIFIED SYMPTOMS AND SIGNS INVOLVING THE CIRCULATORY AND RESPIRATORY SYSTEMS: ICD-10-CM

## 2021-03-22 DIAGNOSIS — J96.01 ACUTE RESPIRATORY FAILURE WITH HYPOXIA: ICD-10-CM

## 2021-03-22 DIAGNOSIS — I10 ESSENTIAL (PRIMARY) HYPERTENSION: ICD-10-CM

## 2021-03-22 LAB
A1C WITH ESTIMATED AVERAGE GLUCOSE RESULT: 6.2 % — HIGH (ref 4–5.6)
ALBUMIN SERPL ELPH-MCNC: 2.8 G/DL — LOW (ref 3.3–5)
ALBUMIN SERPL ELPH-MCNC: 3.2 G/DL — LOW (ref 3.3–5)
ALP SERPL-CCNC: 56 U/L — SIGNIFICANT CHANGE UP (ref 40–120)
ALP SERPL-CCNC: 67 U/L — SIGNIFICANT CHANGE UP (ref 40–120)
ALT FLD-CCNC: 11 U/L — SIGNIFICANT CHANGE UP (ref 10–45)
ALT FLD-CCNC: 14 U/L — SIGNIFICANT CHANGE UP (ref 10–45)
ANION GAP SERPL CALC-SCNC: 13 MMOL/L — SIGNIFICANT CHANGE UP (ref 5–17)
ANION GAP SERPL CALC-SCNC: 15 MMOL/L — SIGNIFICANT CHANGE UP (ref 5–17)
APTT BLD: 103.5 SEC — HIGH (ref 27.5–35.5)
APTT BLD: 29.4 SEC — SIGNIFICANT CHANGE UP (ref 27.5–35.5)
APTT BLD: 49.5 SEC — HIGH (ref 27.5–35.5)
APTT BLD: 76.5 SEC — HIGH (ref 27.5–35.5)
AST SERPL-CCNC: 28 U/L — SIGNIFICANT CHANGE UP (ref 10–40)
AST SERPL-CCNC: 35 U/L — SIGNIFICANT CHANGE UP (ref 10–40)
BASE EXCESS BLDV CALC-SCNC: 1.9 MMOL/L — SIGNIFICANT CHANGE UP (ref -2–2)
BASOPHILS # BLD AUTO: 0.04 K/UL — SIGNIFICANT CHANGE UP (ref 0–0.2)
BASOPHILS # BLD AUTO: 0.04 K/UL — SIGNIFICANT CHANGE UP (ref 0–0.2)
BASOPHILS NFR BLD AUTO: 0.4 % — SIGNIFICANT CHANGE UP (ref 0–2)
BASOPHILS NFR BLD AUTO: 0.4 % — SIGNIFICANT CHANGE UP (ref 0–2)
BILIRUB SERPL-MCNC: 0.5 MG/DL — SIGNIFICANT CHANGE UP (ref 0.2–1.2)
BILIRUB SERPL-MCNC: 0.6 MG/DL — SIGNIFICANT CHANGE UP (ref 0.2–1.2)
BUN SERPL-MCNC: 24 MG/DL — HIGH (ref 7–23)
BUN SERPL-MCNC: 24 MG/DL — HIGH (ref 7–23)
CA-I SERPL-SCNC: 1.12 MMOL/L — SIGNIFICANT CHANGE UP (ref 1.12–1.3)
CALCIUM SERPL-MCNC: 8.3 MG/DL — LOW (ref 8.4–10.5)
CALCIUM SERPL-MCNC: 8.9 MG/DL — SIGNIFICANT CHANGE UP (ref 8.4–10.5)
CHLORIDE BLDV-SCNC: 102 MMOL/L — SIGNIFICANT CHANGE UP (ref 96–108)
CHLORIDE SERPL-SCNC: 100 MMOL/L — SIGNIFICANT CHANGE UP (ref 96–108)
CHLORIDE SERPL-SCNC: 99 MMOL/L — SIGNIFICANT CHANGE UP (ref 96–108)
CK SERPL-CCNC: 136 U/L — SIGNIFICANT CHANGE UP (ref 30–200)
CK SERPL-CCNC: 174 U/L — SIGNIFICANT CHANGE UP (ref 30–200)
CO2 BLDV-SCNC: 29 MMOL/L — SIGNIFICANT CHANGE UP (ref 22–30)
CO2 SERPL-SCNC: 24 MMOL/L — SIGNIFICANT CHANGE UP (ref 22–31)
CO2 SERPL-SCNC: 25 MMOL/L — SIGNIFICANT CHANGE UP (ref 22–31)
CREAT SERPL-MCNC: 1.57 MG/DL — HIGH (ref 0.5–1.3)
CREAT SERPL-MCNC: 1.82 MG/DL — HIGH (ref 0.5–1.3)
CRP SERPL-MCNC: 20.07 MG/DL — HIGH (ref 0–0.4)
D DIMER BLD IA.RAPID-MCNC: 967 NG/ML DDU — HIGH
EOSINOPHIL # BLD AUTO: 0.04 K/UL — SIGNIFICANT CHANGE UP (ref 0–0.5)
EOSINOPHIL # BLD AUTO: 0.13 K/UL — SIGNIFICANT CHANGE UP (ref 0–0.5)
EOSINOPHIL NFR BLD AUTO: 0.4 % — SIGNIFICANT CHANGE UP (ref 0–6)
EOSINOPHIL NFR BLD AUTO: 1.2 % — SIGNIFICANT CHANGE UP (ref 0–6)
ESTIMATED AVERAGE GLUCOSE: 131 MG/DL — HIGH (ref 68–114)
FERRITIN SERPL-MCNC: 932 NG/ML — HIGH (ref 30–400)
GAS PNL BLDV: 136 MMOL/L — SIGNIFICANT CHANGE UP (ref 135–145)
GAS PNL BLDV: SIGNIFICANT CHANGE UP
GLUCOSE BLDV-MCNC: 159 MG/DL — HIGH (ref 70–99)
GLUCOSE SERPL-MCNC: 149 MG/DL — HIGH (ref 70–99)
GLUCOSE SERPL-MCNC: 154 MG/DL — HIGH (ref 70–99)
HCO3 BLDV-SCNC: 27 MMOL/L — SIGNIFICANT CHANGE UP (ref 21–29)
HCT VFR BLD CALC: 41.9 % — SIGNIFICANT CHANGE UP (ref 39–50)
HCT VFR BLD CALC: 45.2 % — SIGNIFICANT CHANGE UP (ref 39–50)
HCT VFR BLD CALC: 45.4 % — SIGNIFICANT CHANGE UP (ref 39–50)
HCT VFR BLDA CALC: 46 % — SIGNIFICANT CHANGE UP (ref 39–50)
HCV AB S/CO SERPL IA: 0.09 S/CO — SIGNIFICANT CHANGE UP (ref 0–0.99)
HCV AB SERPL-IMP: SIGNIFICANT CHANGE UP
HGB BLD CALC-MCNC: 15 G/DL — SIGNIFICANT CHANGE UP (ref 13–17)
HGB BLD-MCNC: 13.7 G/DL — SIGNIFICANT CHANGE UP (ref 13–17)
HGB BLD-MCNC: 14.8 G/DL — SIGNIFICANT CHANGE UP (ref 13–17)
HGB BLD-MCNC: 14.9 G/DL — SIGNIFICANT CHANGE UP (ref 13–17)
IMM GRANULOCYTES NFR BLD AUTO: 1.5 % — SIGNIFICANT CHANGE UP (ref 0–1.5)
IMM GRANULOCYTES NFR BLD AUTO: 1.5 % — SIGNIFICANT CHANGE UP (ref 0–1.5)
INR BLD: 1.27 RATIO — HIGH (ref 0.88–1.16)
LACTATE BLDV-MCNC: 2.5 MMOL/L — HIGH (ref 0.7–2)
LDH SERPL L TO P-CCNC: 478 U/L — HIGH (ref 50–242)
LYMPHOCYTES # BLD AUTO: 0.81 K/UL — LOW (ref 1–3.3)
LYMPHOCYTES # BLD AUTO: 2.22 K/UL — SIGNIFICANT CHANGE UP (ref 1–3.3)
LYMPHOCYTES # BLD AUTO: 20.4 % — SIGNIFICANT CHANGE UP (ref 13–44)
LYMPHOCYTES # BLD AUTO: 7.8 % — LOW (ref 13–44)
MAGNESIUM SERPL-MCNC: 2.2 MG/DL — SIGNIFICANT CHANGE UP (ref 1.6–2.6)
MCHC RBC-ENTMCNC: 28.3 PG — SIGNIFICANT CHANGE UP (ref 27–34)
MCHC RBC-ENTMCNC: 28.4 PG — SIGNIFICANT CHANGE UP (ref 27–34)
MCHC RBC-ENTMCNC: 28.8 PG — SIGNIFICANT CHANGE UP (ref 27–34)
MCHC RBC-ENTMCNC: 32.6 GM/DL — SIGNIFICANT CHANGE UP (ref 32–36)
MCHC RBC-ENTMCNC: 32.7 GM/DL — SIGNIFICANT CHANGE UP (ref 32–36)
MCHC RBC-ENTMCNC: 33 GM/DL — SIGNIFICANT CHANGE UP (ref 32–36)
MCV RBC AUTO: 86.6 FL — SIGNIFICANT CHANGE UP (ref 80–100)
MCV RBC AUTO: 87 FL — SIGNIFICANT CHANGE UP (ref 80–100)
MCV RBC AUTO: 87.4 FL — SIGNIFICANT CHANGE UP (ref 80–100)
MONOCYTES # BLD AUTO: 0.76 K/UL — SIGNIFICANT CHANGE UP (ref 0–0.9)
MONOCYTES # BLD AUTO: 0.96 K/UL — HIGH (ref 0–0.9)
MONOCYTES NFR BLD AUTO: 7.3 % — SIGNIFICANT CHANGE UP (ref 2–14)
MONOCYTES NFR BLD AUTO: 8.8 % — SIGNIFICANT CHANGE UP (ref 2–14)
NEUTROPHILS # BLD AUTO: 7.38 K/UL — SIGNIFICANT CHANGE UP (ref 1.8–7.4)
NEUTROPHILS # BLD AUTO: 8.58 K/UL — HIGH (ref 1.8–7.4)
NEUTROPHILS NFR BLD AUTO: 67.7 % — SIGNIFICANT CHANGE UP (ref 43–77)
NEUTROPHILS NFR BLD AUTO: 82.6 % — HIGH (ref 43–77)
NRBC # BLD: 0 /100 WBCS — SIGNIFICANT CHANGE UP (ref 0–0)
NT-PROBNP SERPL-SCNC: 451 PG/ML — HIGH (ref 0–300)
PCO2 BLDV: 47 MMHG — SIGNIFICANT CHANGE UP (ref 35–50)
PH BLDV: 7.38 — SIGNIFICANT CHANGE UP (ref 7.35–7.45)
PHOSPHATE SERPL-MCNC: 2.8 MG/DL — SIGNIFICANT CHANGE UP (ref 2.5–4.5)
PLATELET # BLD AUTO: 435 K/UL — HIGH (ref 150–400)
PLATELET # BLD AUTO: 477 K/UL — HIGH (ref 150–400)
PLATELET # BLD AUTO: 515 K/UL — HIGH (ref 150–400)
PO2 BLDV: 29 MMHG — SIGNIFICANT CHANGE UP (ref 25–45)
POTASSIUM BLDV-SCNC: 3.4 MMOL/L — LOW (ref 3.5–5.3)
POTASSIUM SERPL-MCNC: 3.7 MMOL/L — SIGNIFICANT CHANGE UP (ref 3.5–5.3)
POTASSIUM SERPL-MCNC: 3.9 MMOL/L — SIGNIFICANT CHANGE UP (ref 3.5–5.3)
POTASSIUM SERPL-SCNC: 3.7 MMOL/L — SIGNIFICANT CHANGE UP (ref 3.5–5.3)
POTASSIUM SERPL-SCNC: 3.9 MMOL/L — SIGNIFICANT CHANGE UP (ref 3.5–5.3)
PROCALCITONIN SERPL-MCNC: 0.12 NG/ML — HIGH (ref 0.02–0.1)
PROT SERPL-MCNC: 6.5 G/DL — SIGNIFICANT CHANGE UP (ref 6–8.3)
PROT SERPL-MCNC: 7.5 G/DL — SIGNIFICANT CHANGE UP (ref 6–8.3)
PROTHROM AB SERPL-ACNC: 15.1 SEC — HIGH (ref 10.6–13.6)
RBC # BLD: 4.84 M/UL — SIGNIFICANT CHANGE UP (ref 4.2–5.8)
RBC # BLD: 5.17 M/UL — SIGNIFICANT CHANGE UP (ref 4.2–5.8)
RBC # BLD: 5.22 M/UL — SIGNIFICANT CHANGE UP (ref 4.2–5.8)
RBC # FLD: 13.8 % — SIGNIFICANT CHANGE UP (ref 10.3–14.5)
RBC # FLD: 13.9 % — SIGNIFICANT CHANGE UP (ref 10.3–14.5)
RBC # FLD: 14.1 % — SIGNIFICANT CHANGE UP (ref 10.3–14.5)
SAO2 % BLDV: 49 % — LOW (ref 67–88)
SARS-COV-2 RNA SPEC QL NAA+PROBE: DETECTED
SODIUM SERPL-SCNC: 136 MMOL/L — SIGNIFICANT CHANGE UP (ref 135–145)
SODIUM SERPL-SCNC: 140 MMOL/L — SIGNIFICANT CHANGE UP (ref 135–145)
TROPONIN T, HIGH SENSITIVITY RESULT: 11 NG/L — SIGNIFICANT CHANGE UP (ref 0–51)
TSH SERPL-MCNC: 0.64 UIU/ML — SIGNIFICANT CHANGE UP (ref 0.27–4.2)
WBC # BLD: 10.39 K/UL — SIGNIFICANT CHANGE UP (ref 3.8–10.5)
WBC # BLD: 10.89 K/UL — HIGH (ref 3.8–10.5)
WBC # BLD: 7.68 K/UL — SIGNIFICANT CHANGE UP (ref 3.8–10.5)
WBC # FLD AUTO: 10.39 K/UL — SIGNIFICANT CHANGE UP (ref 3.8–10.5)
WBC # FLD AUTO: 10.89 K/UL — HIGH (ref 3.8–10.5)
WBC # FLD AUTO: 7.68 K/UL — SIGNIFICANT CHANGE UP (ref 3.8–10.5)

## 2021-03-22 PROCEDURE — 12345: CPT | Mod: NC

## 2021-03-22 PROCEDURE — 99223 1ST HOSP IP/OBS HIGH 75: CPT

## 2021-03-22 PROCEDURE — 71045 X-RAY EXAM CHEST 1 VIEW: CPT | Mod: 26

## 2021-03-22 RX ORDER — HEPARIN SODIUM 5000 [USP'U]/ML
10000 INJECTION INTRAVENOUS; SUBCUTANEOUS ONCE
Refills: 0 | Status: COMPLETED | OUTPATIENT
Start: 2021-03-22 | End: 2021-03-22

## 2021-03-22 RX ORDER — HEPARIN SODIUM 5000 [USP'U]/ML
8000 INJECTION INTRAVENOUS; SUBCUTANEOUS ONCE
Refills: 0 | Status: DISCONTINUED | OUTPATIENT
Start: 2021-03-22 | End: 2021-03-22

## 2021-03-22 RX ORDER — INFLUENZA VIRUS VACCINE 15; 15; 15; 15 UG/.5ML; UG/.5ML; UG/.5ML; UG/.5ML
0.5 SUSPENSION INTRAMUSCULAR ONCE
Refills: 0 | Status: DISCONTINUED | OUTPATIENT
Start: 2021-03-22 | End: 2021-04-05

## 2021-03-22 RX ORDER — REMDESIVIR 5 MG/ML
INJECTION INTRAVENOUS
Refills: 0 | Status: COMPLETED | OUTPATIENT
Start: 2021-03-22 | End: 2021-03-26

## 2021-03-22 RX ORDER — HEPARIN SODIUM 5000 [USP'U]/ML
8000 INJECTION INTRAVENOUS; SUBCUTANEOUS EVERY 6 HOURS
Refills: 0 | Status: DISCONTINUED | OUTPATIENT
Start: 2021-03-22 | End: 2021-03-22

## 2021-03-22 RX ORDER — HEPARIN SODIUM 5000 [USP'U]/ML
5000 INJECTION INTRAVENOUS; SUBCUTANEOUS EVERY 6 HOURS
Refills: 0 | Status: DISCONTINUED | OUTPATIENT
Start: 2021-03-22 | End: 2021-03-25

## 2021-03-22 RX ORDER — HEPARIN SODIUM 5000 [USP'U]/ML
10000 INJECTION INTRAVENOUS; SUBCUTANEOUS EVERY 6 HOURS
Refills: 0 | Status: DISCONTINUED | OUTPATIENT
Start: 2021-03-22 | End: 2021-03-25

## 2021-03-22 RX ORDER — REMDESIVIR 5 MG/ML
100 INJECTION INTRAVENOUS EVERY 24 HOURS
Refills: 0 | Status: COMPLETED | OUTPATIENT
Start: 2021-03-23 | End: 2021-03-26

## 2021-03-22 RX ORDER — REMDESIVIR 5 MG/ML
200 INJECTION INTRAVENOUS EVERY 24 HOURS
Refills: 0 | Status: COMPLETED | OUTPATIENT
Start: 2021-03-22 | End: 2021-03-22

## 2021-03-22 RX ORDER — HEPARIN SODIUM 5000 [USP'U]/ML
4000 INJECTION INTRAVENOUS; SUBCUTANEOUS EVERY 6 HOURS
Refills: 0 | Status: DISCONTINUED | OUTPATIENT
Start: 2021-03-22 | End: 2021-03-22

## 2021-03-22 RX ORDER — DEXAMETHASONE 0.5 MG/5ML
6 ELIXIR ORAL ONCE
Refills: 0 | Status: COMPLETED | OUTPATIENT
Start: 2021-03-22 | End: 2021-03-22

## 2021-03-22 RX ORDER — HEPARIN SODIUM 5000 [USP'U]/ML
INJECTION INTRAVENOUS; SUBCUTANEOUS
Qty: 25000 | Refills: 0 | Status: DISCONTINUED | OUTPATIENT
Start: 2021-03-22 | End: 2021-03-25

## 2021-03-22 RX ORDER — SODIUM CHLORIDE 9 MG/ML
1000 INJECTION INTRAMUSCULAR; INTRAVENOUS; SUBCUTANEOUS
Refills: 0 | Status: DISCONTINUED | OUTPATIENT
Start: 2021-03-22 | End: 2021-03-22

## 2021-03-22 RX ORDER — DEXAMETHASONE 0.5 MG/5ML
6 ELIXIR ORAL DAILY
Refills: 0 | Status: DISCONTINUED | OUTPATIENT
Start: 2021-03-22 | End: 2021-03-31

## 2021-03-22 RX ORDER — HEPARIN SODIUM 5000 [USP'U]/ML
INJECTION INTRAVENOUS; SUBCUTANEOUS
Qty: 25000 | Refills: 0 | Status: DISCONTINUED | OUTPATIENT
Start: 2021-03-22 | End: 2021-03-22

## 2021-03-22 RX ADMIN — HEPARIN SODIUM 2200 UNIT(S)/HR: 5000 INJECTION INTRAVENOUS; SUBCUTANEOUS at 23:25

## 2021-03-22 RX ADMIN — Medication 6 MILLIGRAM(S): at 06:32

## 2021-03-22 RX ADMIN — Medication 6 MILLIGRAM(S): at 01:01

## 2021-03-22 RX ADMIN — HEPARIN SODIUM 10000 UNIT(S): 5000 INJECTION INTRAVENOUS; SUBCUTANEOUS at 03:19

## 2021-03-22 RX ADMIN — HEPARIN SODIUM 1900 UNIT(S)/HR: 5000 INJECTION INTRAVENOUS; SUBCUTANEOUS at 10:17

## 2021-03-22 RX ADMIN — HEPARIN SODIUM 2200 UNIT(S)/HR: 5000 INJECTION INTRAVENOUS; SUBCUTANEOUS at 16:51

## 2021-03-22 RX ADMIN — REMDESIVIR 500 MILLIGRAM(S): 5 INJECTION INTRAVENOUS at 11:13

## 2021-03-22 RX ADMIN — HEPARIN SODIUM 5000 UNIT(S): 5000 INJECTION INTRAVENOUS; SUBCUTANEOUS at 16:55

## 2021-03-22 RX ADMIN — HEPARIN SODIUM 2200 UNIT(S)/HR: 5000 INJECTION INTRAVENOUS; SUBCUTANEOUS at 03:20

## 2021-03-22 NOTE — H&P ADULT - NSHPPHYSICALEXAM_GEN_ALL_CORE
PHYSICAL EXAM:   GENERAL: Alert. Not confused. No acute distress. Not thin. Not cachectic. +obese.  HEAD:  Atraumatic. Normocephalic.  EYES: EOMI. PERRLA. Normal conjunctiva/sclera.  ENT: Neck supple. No JVD. Moist oral mucosa. Not edentulous. No thrush.  LYMPH: Normal supraclavicular/cervical lymph nodes.   CARDIAC: +tachy, Not nat. Regular rhythm. Not irregularly irregular. S1. S2.   LUNG/CHEST: BS equal bilaterally. No wheezes. +rales. +rhonchi.  ABDOMEN: Soft. No tenderness. No distension. No fluid wave. Normal bowel sounds.  BACK: No midline/vertebral tenderness. No flank tenderness.  VASCULAR: +2 b/l radial or ulnar pulses. Palpable DP pulses.  EXTREMITIES:  No clubbing. No cyanosis. No edema. Moving all 4.  NEUROLOGY: A&Ox3. Non-focal exam. Cranial nerves intact. Normal speech. Sensation intact.  PSYCH: Normal behavior. Normal affect.  SKIN: No jaundice. No erythema. No rash/lesion.  Vascular Access:     ICU Vital Signs Last 24 Hrs  T(C): 36.8 (21 Mar 2021 23:52), Max: 36.8 (21 Mar 2021 23:00)  T(F): 98.2 (21 Mar 2021 23:52), Max: 98.3 (21 Mar 2021 23:00)  HR: 88 (22 Mar 2021 00:00) (87 - 88)  BP: 115/78 (22 Mar 2021 00:00) (80/59 - 116/78)  BP(mean): 90 (21 Mar 2021 23:00) (90 - 90)  ABP: --  ABP(mean): --  RR: 24 (22 Mar 2021 00:00) (22 - 25)  SpO2: 94% (22 Mar 2021 00:00) (84% - 100%)      I&O's Summary

## 2021-03-22 NOTE — ED ADULT NURSE REASSESSMENT NOTE - NS ED NURSE REASSESS COMMENT FT1
Spoke with advanced practice team Jozef (NP) regarding heparin order. As per provider, pt to be started on prophylactic heparin for PE. Provider aware pt has not yet received CT. Pt weighed using standing scale, coags sent and resulted in sunrise. Pt safety maintained.

## 2021-03-22 NOTE — H&P ADULT - NSHPREVIEWOFSYSTEMS_GEN_ALL_CORE
REVIEW OF SYSTEMS:  CONSTITUTIONAL: +weakness. No fevers. No chills. No rigors. No weight loss. +poor appetite.  EYES: No blurry or double vision. No eye pain.  ENT: No hearing difficulty. No vertigo. No dysphagia. +tickle in throat. No Sinusitis/rhinorrhea.   NECK: No pain. No stiffness/rigidity.  CARDIAC: No chest pain. No palpitations. No lightheadedness. No syncope.  RESPIRATORY: No cough. +SOB. No hemoptysis.  GASTROINTESTINAL: No abdominal pain. No nausea. No vomiting. No hematemesis. No diarrhea. No constipation.   GENITOURINARY: No dysuria. No frequency. No hesitancy. No hematuria. No oliguria.  NEUROLOGICAL: No numbness/tingling. No focal weakness. No urinary or fecal incontinence. No headache. No unsteady gait.  BACK: No back pain. No flank pain.  EXTREMITIES: No lower extremity edema. Full ROM. No joint pain.  SKIN: No rashes. No itching. No other lesions.  PSYCHIATRIC: No depression. No anxiety. No SI/HI.  ALLERGIC: No lip swelling. No hives.  All other review of systems is negative unless indicated above.  Unless indicated above, unable to assess ROS 2/2

## 2021-03-22 NOTE — H&P ADULT - ASSESSMENT
65M c hx MO, HTN, recent covid vaccine (2.5 weeks ago), pw hypoxic respiratory failure likely 2/2 covid.

## 2021-03-22 NOTE — ED ADULT NURSE REASSESSMENT NOTE - NS ED NURSE REASSESS COMMENT FT1
Pt started on Heparin drip with second RN at bedside as co-signer. Pt has 2 IV lines in place, weight documented in sunrise. Risks and benefits of medications explained to pt. Pt safety maintained. Pt started on Heparin drip with second RN at bedside as co-signer. Pt has 2 IV lines in place, weight documented in sunrise. Risks and benefits of medications explained to pt, pt verbalized understanding. Pt safety maintained.

## 2021-03-22 NOTE — H&P ADULT - HISTORY OF PRESENT ILLNESS
65M c hx MO, HTN, recent covid vaccine (2.5 weeks ago), pw 9 days sore throat, and 4 days sob.    Pt reports poor appetite, 4 days of worsening SOB. Pt denies leg swelling or calf tenderness. No hx of blood clots. Denies CP, fevers, cough, diarrhea. Pt last had blood work 1 year ago, and states he was never told he had any kidney problems before. Reports urinating well, without blood or dysuria.    VS: Tm 98.3, P 88, BP 80/59, R 25, 84% RA  In the ED, received decadron, tylenol.

## 2021-03-22 NOTE — H&P ADULT - ATTENDING COMMENTS
I&D R foot with VAC placement/yes yes/I&D R plantar foot with stravix graft and VAC placement Pt seen and examined, and plan as above. Pt covered by NP service for episodic care.

## 2021-03-22 NOTE — H&P ADULT - PROBLEM SELECTOR PLAN 1
- b/l diffuse lung opacities on cxr, concerning for covid PNA  - given elevated d-dimer, tachycardia, q3t3, will empirically start hep gtt for poss PE  - can't cta 2/2 renal failure  - cont O2 as needed  - CT chest noncon ordered  - VQ scan ordered

## 2021-03-22 NOTE — H&P ADULT - NSHPSOCIALHISTORY_GEN_ALL_CORE
Social History:    Marital Status: (  ) , ( x ) Single, (  ) , (  ) , (  )   # of Children: none  Lives with: (  ) alone, (  ) children, (  ) spouse, ( x ) parents, (  ) siblings, (  ) friends, (  ) other:   Occupation:     Substance Use/Illicit Drugs: (  ) never used vs other:   Tobacco Usage: ( x ) never smoked, (  ) former smoker, (  ) current smoker and Total Pack-Years:   Last Alcohol Usage/Frequency/Amount/Withdrawal/Hx of Abuse:  last drink 2 weeks ago  Foreign travel:   Animal exposure:

## 2021-03-22 NOTE — H&P ADULT - NSHPLABSRESULTS_GEN_ALL_CORE
Personally reviewed old records.  Personally reviewed labs.  Personally reviewed imaging.  Personally reviewed EKG. NSR rate 84, qtc 453. Qwave in 3/R, TWI in 3/AVR. No ST changes.                          14.8   10.89 )-----------( 477      ( 21 Mar 2021 23:58 )             45.4       03-21    140  |  100  |  24<H>  ----------------------------<  154<H>  3.9   |  25  |  1.82<H>    Ca    8.9      21 Mar 2021 23:58    TPro  7.5  /  Alb  3.2<L>  /  TBili  0.6  /  DBili  x   /  AST  35  /  ALT  14  /  AlkPhos  67  03-21      CARDIAC MARKERS ( 21 Mar 2021 23:58 )  x     / x     / 174 U/L / x     / x            LIVER FUNCTIONS - ( 21 Mar 2021 23:58 )  Alb: 3.2 g/dL / Pro: 7.5 g/dL / ALK PHOS: 67 U/L / ALT: 14 U/L / AST: 35 U/L / GGT: x

## 2021-03-23 DIAGNOSIS — I48.91 UNSPECIFIED ATRIAL FIBRILLATION: ICD-10-CM

## 2021-03-23 LAB
ALBUMIN SERPL ELPH-MCNC: 2.8 G/DL — LOW (ref 3.3–5)
ALP SERPL-CCNC: 58 U/L — SIGNIFICANT CHANGE UP (ref 40–120)
ALT FLD-CCNC: 13 U/L — SIGNIFICANT CHANGE UP (ref 10–45)
ANION GAP SERPL CALC-SCNC: 14 MMOL/L — SIGNIFICANT CHANGE UP (ref 5–17)
APPEARANCE UR: CLEAR — SIGNIFICANT CHANGE UP
APTT BLD: 111.1 SEC — HIGH (ref 27.5–35.5)
APTT BLD: 44.1 SEC — HIGH (ref 27.5–35.5)
APTT BLD: 91.3 SEC — HIGH (ref 27.5–35.5)
AST SERPL-CCNC: 29 U/L — SIGNIFICANT CHANGE UP (ref 10–40)
BILIRUB SERPL-MCNC: 0.2 MG/DL — SIGNIFICANT CHANGE UP (ref 0.2–1.2)
BILIRUB UR-MCNC: NEGATIVE — SIGNIFICANT CHANGE UP
BUN SERPL-MCNC: 34 MG/DL — HIGH (ref 7–23)
CALCIUM SERPL-MCNC: 9.1 MG/DL — SIGNIFICANT CHANGE UP (ref 8.4–10.5)
CHLORIDE SERPL-SCNC: 98 MMOL/L — SIGNIFICANT CHANGE UP (ref 96–108)
CK MB CFR SERPL CALC: 2.3 NG/ML — SIGNIFICANT CHANGE UP (ref 0–6.7)
CK SERPL-CCNC: 65 U/L — SIGNIFICANT CHANGE UP (ref 30–200)
CO2 SERPL-SCNC: 25 MMOL/L — SIGNIFICANT CHANGE UP (ref 22–31)
COLOR SPEC: YELLOW — SIGNIFICANT CHANGE UP
CREAT ?TM UR-MCNC: 101 MG/DL — SIGNIFICANT CHANGE UP
CREAT SERPL-MCNC: 1.35 MG/DL — HIGH (ref 0.5–1.3)
CRP SERPL-MCNC: 11.36 MG/DL — HIGH (ref 0–0.4)
D DIMER BLD IA.RAPID-MCNC: 816 NG/ML DDU — HIGH
DIFF PNL FLD: ABNORMAL
FERRITIN SERPL-MCNC: 852 NG/ML — HIGH (ref 30–400)
GLUCOSE SERPL-MCNC: 146 MG/DL — HIGH (ref 70–99)
GLUCOSE UR QL: NEGATIVE — SIGNIFICANT CHANGE UP
HCT VFR BLD CALC: 44.2 % — SIGNIFICANT CHANGE UP (ref 39–50)
HGB BLD-MCNC: 14.3 G/DL — SIGNIFICANT CHANGE UP (ref 13–17)
KETONES UR-MCNC: NEGATIVE — SIGNIFICANT CHANGE UP
LEUKOCYTE ESTERASE UR-ACNC: NEGATIVE — SIGNIFICANT CHANGE UP
MCHC RBC-ENTMCNC: 28.4 PG — SIGNIFICANT CHANGE UP (ref 27–34)
MCHC RBC-ENTMCNC: 32.4 GM/DL — SIGNIFICANT CHANGE UP (ref 32–36)
MCV RBC AUTO: 87.9 FL — SIGNIFICANT CHANGE UP (ref 80–100)
NITRITE UR-MCNC: NEGATIVE — SIGNIFICANT CHANGE UP
NRBC # BLD: 0 /100 WBCS — SIGNIFICANT CHANGE UP (ref 0–0)
PH UR: 6 — SIGNIFICANT CHANGE UP (ref 5–8)
PLATELET # BLD AUTO: 558 K/UL — HIGH (ref 150–400)
POTASSIUM SERPL-MCNC: 4.2 MMOL/L — SIGNIFICANT CHANGE UP (ref 3.5–5.3)
POTASSIUM SERPL-SCNC: 4.2 MMOL/L — SIGNIFICANT CHANGE UP (ref 3.5–5.3)
PROCALCITONIN SERPL-MCNC: 0.1 NG/ML — SIGNIFICANT CHANGE UP (ref 0.02–0.1)
PROT SERPL-MCNC: 6.9 G/DL — SIGNIFICANT CHANGE UP (ref 6–8.3)
PROT UR-MCNC: NEGATIVE — SIGNIFICANT CHANGE UP
RBC # BLD: 5.03 M/UL — SIGNIFICANT CHANGE UP (ref 4.2–5.8)
RBC # FLD: 14.2 % — SIGNIFICANT CHANGE UP (ref 10.3–14.5)
SODIUM SERPL-SCNC: 137 MMOL/L — SIGNIFICANT CHANGE UP (ref 135–145)
SODIUM UR-SCNC: 63 MMOL/L — SIGNIFICANT CHANGE UP
SP GR SPEC: 1.02 — SIGNIFICANT CHANGE UP (ref 1.01–1.02)
TROPONIN T, HIGH SENSITIVITY RESULT: <6 NG/L — SIGNIFICANT CHANGE UP (ref 0–51)
UROBILINOGEN FLD QL: ABNORMAL
WBC # BLD: 17.99 K/UL — HIGH (ref 3.8–10.5)
WBC # FLD AUTO: 17.99 K/UL — HIGH (ref 3.8–10.5)

## 2021-03-23 PROCEDURE — 93010 ELECTROCARDIOGRAM REPORT: CPT

## 2021-03-23 PROCEDURE — 99233 SBSQ HOSP IP/OBS HIGH 50: CPT

## 2021-03-23 RX ORDER — DILTIAZEM HCL 120 MG
30 CAPSULE, EXT RELEASE 24 HR ORAL EVERY 6 HOURS
Refills: 0 | Status: DISCONTINUED | OUTPATIENT
Start: 2021-03-23 | End: 2021-03-23

## 2021-03-23 RX ORDER — DILTIAZEM HCL 120 MG
30 CAPSULE, EXT RELEASE 24 HR ORAL EVERY 6 HOURS
Refills: 0 | Status: DISCONTINUED | OUTPATIENT
Start: 2021-03-23 | End: 2021-03-26

## 2021-03-23 RX ORDER — DILTIAZEM HCL 120 MG
5 CAPSULE, EXT RELEASE 24 HR ORAL
Qty: 125 | Refills: 0 | Status: DISCONTINUED | OUTPATIENT
Start: 2021-03-23 | End: 2021-03-24

## 2021-03-23 RX ORDER — METOPROLOL TARTRATE 50 MG
5 TABLET ORAL ONCE
Refills: 0 | Status: DISCONTINUED | OUTPATIENT
Start: 2021-03-23 | End: 2021-03-23

## 2021-03-23 RX ORDER — METOPROLOL TARTRATE 50 MG
5 TABLET ORAL ONCE
Refills: 0 | Status: COMPLETED | OUTPATIENT
Start: 2021-03-23 | End: 2021-03-23

## 2021-03-23 RX ORDER — DILTIAZEM HCL 120 MG
15 CAPSULE, EXT RELEASE 24 HR ORAL ONCE
Refills: 0 | Status: COMPLETED | OUTPATIENT
Start: 2021-03-23 | End: 2021-03-23

## 2021-03-23 RX ADMIN — Medication 5 MILLIGRAM(S): at 09:10

## 2021-03-23 RX ADMIN — HEPARIN SODIUM 2500 UNIT(S)/HR: 5000 INJECTION INTRAVENOUS; SUBCUTANEOUS at 16:15

## 2021-03-23 RX ADMIN — Medication 15 MILLIGRAM(S): at 11:39

## 2021-03-23 RX ADMIN — Medication 6 MILLIGRAM(S): at 05:55

## 2021-03-23 RX ADMIN — HEPARIN SODIUM 2500 UNIT(S)/HR: 5000 INJECTION INTRAVENOUS; SUBCUTANEOUS at 08:29

## 2021-03-23 RX ADMIN — Medication 30 MILLIGRAM(S): at 17:26

## 2021-03-23 RX ADMIN — HEPARIN SODIUM 5000 UNIT(S): 5000 INJECTION INTRAVENOUS; SUBCUTANEOUS at 08:36

## 2021-03-23 RX ADMIN — Medication 30 MILLIGRAM(S): at 23:45

## 2021-03-23 RX ADMIN — HEPARIN SODIUM 2200 UNIT(S)/HR: 5000 INJECTION INTRAVENOUS; SUBCUTANEOUS at 22:29

## 2021-03-23 RX ADMIN — Medication 5 MG/HR: at 13:12

## 2021-03-23 RX ADMIN — Medication 5 MILLIGRAM(S): at 08:42

## 2021-03-23 RX ADMIN — Medication 30 MILLIGRAM(S): at 11:39

## 2021-03-23 RX ADMIN — REMDESIVIR 500 MILLIGRAM(S): 5 INJECTION INTRAVENOUS at 10:23

## 2021-03-23 NOTE — CONSULT NOTE ADULT - TIME BILLING
Pt seen and examined, agree with the above assessment and plan by MONISHA Godinez.  5M c hx MO, HTN, recent covid vaccine (2.5 weeks ago), pw 9 days sore throat, and 4 days sob.  Cardiac eval for new onset afib in setting of Covid  -s/p Lopressor IVP x2, Cardizem IVP x 1, Cardizem 30 mg PO  -rates slowly improving - c/w cardizem gtt 5ml/hr for now, cardizem 30 mg PO  -monitor rates - if rates improving can wean off Cardizem gtt  -if rates become elevated or pt becomes symptomatic - can trial digoxin   -ChadsVasc 1: c/w hep gtt for now  -check echo to rule out LV or valvular dysfunction

## 2021-03-23 NOTE — CONSULT NOTE ADULT - SUBJECTIVE AND OBJECTIVE BOX
CARDIOLOGY CONSULT - Dr. Pyle         HPI:  65M c hx MO, HTN, recent covid vaccine (2.5 weeks ago), pw 9 days sore throat, and 4 days sob.  Pt reports poor appetite, 4 days of worsening SOB. Pt denies leg swelling or calf tenderness. No hx of blood clots. Denies CP, fevers, cough, diarrhea. Pt last had blood work 1 year ago, and states he was never told he had any kidney problems before. Reports urinating well, without blood or dysuria.  Cardiac eval for new onset afib.  Patient denies any cardiac hx or recent cardiac testing.  Pt currently denies any cp, palpitations, dizziness.  SOB improving.  ROS otherwise neg     VS: Tm 98.3, P 88, BP 80/59, R 25, 84% RA  In the ED, received decadron, tylenol. (22 Mar 2021 02:28)      PAST MEDICAL & SURGICAL HISTORY:  HTN (hypertension)    No significant past surgical history            PREVIOUS DIAGNOSTIC TESTING:    [ ] Echocardiogram:  [ ]  Catheterization:  [ ] Stress Test:  	    MEDICATIONS:  Home Medications:  Hyzaar 100 mg-12.5 mg oral tablet: 1 tab(s) orally once a day (22 Mar 2021 02:17)      MEDICATIONS  (STANDING):  dexAMETHasone  Injectable 6 milliGRAM(s) IV Push daily  diltiazem    Tablet 30 milliGRAM(s) Oral every 6 hours  diltiazem Infusion 5 mG/Hr (5 mL/Hr) IV Continuous <Continuous>  heparin  Infusion.  Unit(s)/Hr (22 mL/Hr) IV Continuous <Continuous>  influenza   Vaccine 0.5 milliLiter(s) IntraMuscular once  remdesivir  IVPB   IV Intermittent   remdesivir  IVPB 100 milliGRAM(s) IV Intermittent every 24 hours      FAMILY HISTORY:  No pertinent family history in first degree relatives        SOCIAL HISTORY:    [x ] Non-smoker  [ ] Smoker  [ ] Alcohol    Allergies    No Known Allergies    Intolerances    	    REVIEW OF SYSTEMS:  CONSTITUTIONAL: No fever, weight loss, or fatigue  EYES: No eye pain, visual disturbances, or discharge  ENMT:  No difficulty hearing, tinnitus, vertigo; No sinus or throat pain  NECK: No pain or stiffness  RESPIRATORY: No cough, wheezing, chills or hemoptysis; + Shortness of Breath  CARDIOVASCULAR: No chest pain, palpitations, passing out, dizziness, or leg swelling  GASTROINTESTINAL: No abdominal or epigastric pain. No nausea, vomiting, or hematemesis; No diarrhea or constipation. No melena or hematochezia.  GENITOURINARY: No dysuria, frequency, hematuria, or incontinence  NEUROLOGICAL: No headaches, memory loss, loss of strength, numbness, or tremors  SKIN: No itching, burning, rashes, or lesions   	    [x ] All others negative	see hpi   [ ] Unable to obtain    PHYSICAL EXAM:  T(C): 37.1 (03-23-21 @ 11:39), Max: 37.1 (03-22-21 @ 20:27)  HR: 119 (03-23-21 @ 13:12) (71 - 156)  BP: 117/73 (03-23-21 @ 13:12) (117/73 - 132/89)  RR: 18 (03-23-21 @ 13:12) (18 - 20)  SpO2: 93% (03-23-21 @ 13:12) (91% - 94%)  Wt(kg): --  I&O's Summary    22 Mar 2021 07:01  -  23 Mar 2021 07:00  --------------------------------------------------------  IN: 0 mL / OUT: 450 mL / NET: -450 mL        Appearance: Normal	  Psychiatry: A & O x 3, Mood & affect appropriate  HEENT:   Normal oral mucosa, PERRL, EOMI	  Lymphatic: No lymphadenopathy  Cardiovascular: Normal S1 S2,irregular, No JVD, No murmurs  Respiratory: Lungs clear to auscultation	  Gastrointestinal:  Soft, Non-tender, + BS	  Skin: No rashes, No ecchymoses, No cyanosis	  Neurologic: Non-focal  Extremities: Normal range of motion, No clubbing, cyanosis or edema  Vascular: Peripheral pulses palpable 2+ bilaterally    TELEMETRY: afib w rvr 120-150s    ECG:  afib 	  RADIOLOGY:  Xray Chest 1 View- PORTABLE-Urgent (03.22.21 @ 00:26)   FINDINGS:    The heart size cannot be assessed on this projection.    Diffuse bilateral patchy opacities. No pneumothorax. No pleural effusion.    No acute osseous abnormalities.    IMPRESSION:  Diffuse bilateral patchy opacities. Multifocal pneumonia. If clinically indicated CT scan should be obtained.          OTHER: 	  	  LABS:	 	    CARDIAC MARKERS:  Troponin T, High Sensitivity Result: <6 ng/L (03-23 @ 09:59)  Troponin T, High Sensitivity Result: 11 ng/L (03-22 @ 02:42)                                  14.3   17.99 )-----------( 558      ( 23 Mar 2021 06:30 )             44.2     03-23    137  |  98  |  34<H>  ----------------------------<  146<H>  4.2   |  25  |  1.35<H>    Ca    9.1      23 Mar 2021 06:32  Phos  2.8     03-22  Mg     2.2     03-22    TPro  6.9  /  Alb  2.8<L>  /  TBili  0.2  /  DBili  x   /  AST  29  /  ALT  13  /  AlkPhos  58  03-23    PT/INR - ( 22 Mar 2021 02:42 )   PT: 15.1 sec;   INR: 1.27 ratio         PTT - ( 23 Mar 2021 06:32 )  PTT:44.1 sec  proBNP:   Lipid Profile:   HgA1c:   TSH:

## 2021-03-23 NOTE — CONSULT NOTE ADULT - ASSESSMENT
a/p     65M c hx MO, HTN, recent covid vaccine (2.5 weeks ago), pw 9 days sore throat, and 4 days sob.  Cardiac eval for new onset afib    1. New onset Afib   -likely in setting of Covid  -s/p Lopressor IVP x2, Cardizem IVP x 1, Cardizem 30 mg PO  -rates slowly improving - c/w cardizem gtt 5ml/hr for now, cardizem 30 mg PO  -monitor rates - if rates improving can wean off Cardizem gtt  -if rates become elevated or pt becomes symptomatic - can trial digoxin   -ChadsVasc 1: c/w hep gtt for now  -check echo to rule out LV or valvular dysfunction     2. Covid-19 -02 supp as needed   -CXR noted   -hsT neg, no acs on ekg   -cont decadron, remdesivir  -on hep gtt for elevated ddimer   -can check an echo when feasible  -mgmt per med     3. HTN  -bp stable  -cont cardizem     4. SIMEON  -cr improving s/p IVF  -f/u med    dvt ppx    plan discussed with ACP

## 2021-03-23 NOTE — PROGRESS NOTE ADULT - SUBJECTIVE AND OBJECTIVE BOX
[facility] Division of Hospital Medicine  Marquez Garner MD, SUPRIYA  Pager (M-F, 8A-5P): [phone]  Other Times:  [phone]    Patient is a 65y old  Male who presents with a chief complaint of sob (22 Mar 2021 02:28)      SUBJECTIVE / OVERNIGHT EVENTS:  No events overnight. This morning around 8am he developed Afib with RVR on telemetry, but no symptoms. Denies chest pain or worsening SOB.     MEDICATIONS  (STANDING):  dexAMETHasone  Injectable 6 milliGRAM(s) IV Push daily  diltiazem    Tablet 30 milliGRAM(s) Oral every 6 hours  diltiazem Infusion 5 mG/Hr (5 mL/Hr) IV Continuous <Continuous>  heparin  Infusion.  Unit(s)/Hr (22 mL/Hr) IV Continuous <Continuous>  influenza   Vaccine 0.5 milliLiter(s) IntraMuscular once  remdesivir  IVPB   IV Intermittent   remdesivir  IVPB 100 milliGRAM(s) IV Intermittent every 24 hours    MEDICATIONS  (PRN):  heparin   Injectable 19317 Unit(s) IV Push every 6 hours PRN For aPTT less than 40  heparin   Injectable 5000 Unit(s) IV Push every 6 hours PRN For aPTT between 40 - 57      I&O's Summary    22 Mar 2021 07:01  -  23 Mar 2021 07:00  --------------------------------------------------------  IN: 0 mL / OUT: 450 mL / NET: -450 mL        PHYSICAL EXAM:  Vital Signs Last 24 Hrs  T(C): 37.1 (23 Mar 2021 11:39), Max: 37.1 (22 Mar 2021 20:27)  T(F): 98.7 (23 Mar 2021 11:39), Max: 98.8 (22 Mar 2021 20:27)  HR: 119 (23 Mar 2021 13:12) (71 - 156)  BP: 117/73 (23 Mar 2021 13:12) (117/73 - 132/89)  BP(mean): --  RR: 18 (23 Mar 2021 13:12) (18 - 20)  SpO2: 93% (23 Mar 2021 13:12) (91% - 94%)    GENERAL: Obese man, NAD.  HEAD:  Atraumatic. Normocephalic.  EYES: EOMI. Normal conjunctiva/sclera.  ENT: Neck supple. No JVD.  CARDIAC: +tachy, irregular, S1. S2. no murmur  LUNG/CHEST: BS equal bilaterally. No wheezes. +rales  ABDOMEN: Soft. No tenderness. No distension. Normal bowel sounds.  BACK: No midline/vertebral tenderness. No flank tenderness.  EXTREMITIES:  No clubbing. No cyanosis. No edema. Moving all 4.  NEUROLOGY: A&Ox3. Non-focal exam. Cranial nerves intact. Normal speech. Sensation intact.  PSYCH: Normal behavior. Normal affect.      LABS:                        14.3   17.99 )-----------( 558      ( 23 Mar 2021 06:30 )             44.2     03-23    137  |  98  |  34<H>  ----------------------------<  146<H>  4.2   |  25  |  1.35<H>    Ca    9.1      23 Mar 2021 06:32  Phos  2.8     03-22  Mg     2.2     03-22    TPro  6.9  /  Alb  2.8<L>  /  TBili  0.2  /  DBili  x   /  AST  29  /  ALT  13  /  AlkPhos  58  03-23    PT/INR - ( 22 Mar 2021 02:42 )   PT: 15.1 sec;   INR: 1.27 ratio         PTT - ( 23 Mar 2021 06:32 )  PTT:44.1 sec  CARDIAC MARKERS ( 23 Mar 2021 09:59 )  x     / x     / 65 U/L / x     / 2.3 ng/mL  CARDIAC MARKERS ( 22 Mar 2021 02:42 )  x     / x     / 136 U/L / x     / x      CARDIAC MARKERS ( 21 Mar 2021 23:58 )  x     / x     / 174 U/L / x     / x            RADIOLOGY & ADDITIONAL TESTS:    Lab Results Reviewed.    Telemetry reviewed:   Afib with RVR

## 2021-03-24 LAB
ALBUMIN SERPL ELPH-MCNC: 2.8 G/DL — LOW (ref 3.3–5)
ALP SERPL-CCNC: 79 U/L — SIGNIFICANT CHANGE UP (ref 40–120)
ALT FLD-CCNC: 15 U/L — SIGNIFICANT CHANGE UP (ref 10–45)
ANION GAP SERPL CALC-SCNC: 11 MMOL/L — SIGNIFICANT CHANGE UP (ref 5–17)
ANION GAP SERPL CALC-SCNC: 14 MMOL/L — SIGNIFICANT CHANGE UP (ref 5–17)
APTT BLD: 136 SEC — CRITICAL HIGH (ref 27.5–35.5)
APTT BLD: 42.8 SEC — HIGH (ref 27.5–35.5)
APTT BLD: 53.2 SEC — HIGH (ref 27.5–35.5)
AST SERPL-CCNC: 36 U/L — SIGNIFICANT CHANGE UP (ref 10–40)
BILIRUB SERPL-MCNC: 0.4 MG/DL — SIGNIFICANT CHANGE UP (ref 0.2–1.2)
BUN SERPL-MCNC: 31 MG/DL — HIGH (ref 7–23)
BUN SERPL-MCNC: 32 MG/DL — HIGH (ref 7–23)
CALCIUM SERPL-MCNC: 9 MG/DL — SIGNIFICANT CHANGE UP (ref 8.4–10.5)
CALCIUM SERPL-MCNC: 9.1 MG/DL — SIGNIFICANT CHANGE UP (ref 8.4–10.5)
CHLORIDE SERPL-SCNC: 101 MMOL/L — SIGNIFICANT CHANGE UP (ref 96–108)
CHLORIDE SERPL-SCNC: 102 MMOL/L — SIGNIFICANT CHANGE UP (ref 96–108)
CO2 SERPL-SCNC: 24 MMOL/L — SIGNIFICANT CHANGE UP (ref 22–31)
CO2 SERPL-SCNC: 26 MMOL/L — SIGNIFICANT CHANGE UP (ref 22–31)
CREAT SERPL-MCNC: 1.15 MG/DL — SIGNIFICANT CHANGE UP (ref 0.5–1.3)
CREAT SERPL-MCNC: 1.25 MG/DL — SIGNIFICANT CHANGE UP (ref 0.5–1.3)
CRP SERPL-MCNC: 4.38 MG/DL — HIGH (ref 0–0.4)
D DIMER BLD IA.RAPID-MCNC: 481 NG/ML DDU — HIGH
FERRITIN SERPL-MCNC: 686 NG/ML — HIGH (ref 30–400)
GLUCOSE SERPL-MCNC: 133 MG/DL — HIGH (ref 70–99)
GLUCOSE SERPL-MCNC: 193 MG/DL — HIGH (ref 70–99)
HCT VFR BLD CALC: 47.8 % — SIGNIFICANT CHANGE UP (ref 39–50)
HGB BLD-MCNC: 15.5 G/DL — SIGNIFICANT CHANGE UP (ref 13–17)
MAGNESIUM SERPL-MCNC: 2.3 MG/DL — SIGNIFICANT CHANGE UP (ref 1.6–2.6)
MCHC RBC-ENTMCNC: 28.5 PG — SIGNIFICANT CHANGE UP (ref 27–34)
MCHC RBC-ENTMCNC: 32.4 GM/DL — SIGNIFICANT CHANGE UP (ref 32–36)
MCV RBC AUTO: 88 FL — SIGNIFICANT CHANGE UP (ref 80–100)
NRBC # BLD: 0 /100 WBCS — SIGNIFICANT CHANGE UP (ref 0–0)
PHOSPHATE SERPL-MCNC: 3.6 MG/DL — SIGNIFICANT CHANGE UP (ref 2.5–4.5)
PLATELET # BLD AUTO: 664 K/UL — HIGH (ref 150–400)
POTASSIUM SERPL-MCNC: 4.9 MMOL/L — SIGNIFICANT CHANGE UP (ref 3.5–5.3)
POTASSIUM SERPL-MCNC: 5.5 MMOL/L — HIGH (ref 3.5–5.3)
POTASSIUM SERPL-SCNC: 4.9 MMOL/L — SIGNIFICANT CHANGE UP (ref 3.5–5.3)
POTASSIUM SERPL-SCNC: 5.5 MMOL/L — HIGH (ref 3.5–5.3)
PROCALCITONIN SERPL-MCNC: 0.05 NG/ML — SIGNIFICANT CHANGE UP (ref 0.02–0.1)
PROT ?TM UR-MCNC: 14 MG/DL — HIGH (ref 0–12)
PROT SERPL-MCNC: 6.7 G/DL — SIGNIFICANT CHANGE UP (ref 6–8.3)
RBC # BLD: 5.43 M/UL — SIGNIFICANT CHANGE UP (ref 4.2–5.8)
RBC # FLD: 14.6 % — HIGH (ref 10.3–14.5)
SODIUM SERPL-SCNC: 139 MMOL/L — SIGNIFICANT CHANGE UP (ref 135–145)
SODIUM SERPL-SCNC: 139 MMOL/L — SIGNIFICANT CHANGE UP (ref 135–145)
TSH SERPL-MCNC: 1.36 UIU/ML — SIGNIFICANT CHANGE UP (ref 0.27–4.2)
UUN UR-MCNC: 1146 MG/DL — SIGNIFICANT CHANGE UP
WBC # BLD: 20.9 K/UL — HIGH (ref 3.8–10.5)
WBC # FLD AUTO: 20.9 K/UL — HIGH (ref 3.8–10.5)

## 2021-03-24 PROCEDURE — 99233 SBSQ HOSP IP/OBS HIGH 50: CPT

## 2021-03-24 PROCEDURE — 71275 CT ANGIOGRAPHY CHEST: CPT | Mod: 26

## 2021-03-24 RX ADMIN — REMDESIVIR 500 MILLIGRAM(S): 5 INJECTION INTRAVENOUS at 10:16

## 2021-03-24 RX ADMIN — Medication 30 MILLIGRAM(S): at 17:17

## 2021-03-24 RX ADMIN — Medication 30 MILLIGRAM(S): at 11:52

## 2021-03-24 RX ADMIN — HEPARIN SODIUM 2500 UNIT(S)/HR: 5000 INJECTION INTRAVENOUS; SUBCUTANEOUS at 05:52

## 2021-03-24 RX ADMIN — HEPARIN SODIUM 5000 UNIT(S): 5000 INJECTION INTRAVENOUS; SUBCUTANEOUS at 05:53

## 2021-03-24 RX ADMIN — Medication 30 MILLIGRAM(S): at 05:23

## 2021-03-24 RX ADMIN — HEPARIN SODIUM 0 UNIT(S)/HR: 5000 INJECTION INTRAVENOUS; SUBCUTANEOUS at 14:02

## 2021-03-24 RX ADMIN — Medication 6 MILLIGRAM(S): at 05:24

## 2021-03-24 RX ADMIN — HEPARIN SODIUM 2100 UNIT(S)/HR: 5000 INJECTION INTRAVENOUS; SUBCUTANEOUS at 15:07

## 2021-03-24 RX ADMIN — HEPARIN SODIUM 2400 UNIT(S)/HR: 5000 INJECTION INTRAVENOUS; SUBCUTANEOUS at 22:02

## 2021-03-24 RX ADMIN — Medication 30 MILLIGRAM(S): at 23:39

## 2021-03-24 RX ADMIN — HEPARIN SODIUM 5000 UNIT(S): 5000 INJECTION INTRAVENOUS; SUBCUTANEOUS at 22:03

## 2021-03-24 NOTE — PROGRESS NOTE ADULT - SUBJECTIVE AND OBJECTIVE BOX
[facility] Division of Hospital Medicine  Marquez Garner MD, SUPRIYA  Pager (M-F, 8A-5P): [phone]  Other Times:  [phone]    Patient is a 65y old  Male who presents with a chief complaint of sob (23 Mar 2021 13:41)      SUBJECTIVE / OVERNIGHT EVENTS:  No events overnight. He broke back to normal sinus rhythm overnight. Currently no new complaints.     MEDICATIONS  (STANDING):  dexAMETHasone  Injectable 6 milliGRAM(s) IV Push daily  diltiazem    Tablet 30 milliGRAM(s) Oral every 6 hours  heparin  Infusion.  Unit(s)/Hr (22 mL/Hr) IV Continuous <Continuous>  influenza   Vaccine 0.5 milliLiter(s) IntraMuscular once  remdesivir  IVPB   IV Intermittent   remdesivir  IVPB 100 milliGRAM(s) IV Intermittent every 24 hours    MEDICATIONS  (PRN):  heparin   Injectable 67326 Unit(s) IV Push every 6 hours PRN For aPTT less than 40  heparin   Injectable 5000 Unit(s) IV Push every 6 hours PRN For aPTT between 40 - 57      I&O's Summary    23 Mar 2021 07:01  -  24 Mar 2021 07:00  --------------------------------------------------------  IN: 205 mL / OUT: 0 mL / NET: 205 mL        PHYSICAL EXAM:  Vital Signs Last 24 Hrs  T(C): 36.8 (24 Mar 2021 11:32), Max: 36.9 (24 Mar 2021 00:05)  T(F): 98.2 (24 Mar 2021 11:32), Max: 98.4 (24 Mar 2021 00:05)  HR: 70 (24 Mar 2021 11:32) (70 - 100)  BP: 131/86 (24 Mar 2021 11:32) (119/77 - 131/86)  BP(mean): --  RR: 18 (24 Mar 2021 11:32) (18 - 18)  SpO2: 97% (24 Mar 2021 11:32) (92% - 97%)    GENERAL: Obese man, NAD.  HEAD:  Atraumatic. Normocephalic.  EYES: EOMI. Normal conjunctiva/sclera.  ENT: Neck supple. No JVD.  CARDIAC: +tachy, irregular, S1. S2. no murmur  LUNG/CHEST: BS equal bilaterally. No wheezes. +rales  ABDOMEN: Soft. No tenderness. No distension. Normal bowel sounds.  BACK: No midline/vertebral tenderness. No flank tenderness.  EXTREMITIES:  No clubbing. No cyanosis. No edema. Moving all 4.  NEUROLOGY: A&Ox3. Non-focal exam. Cranial nerves intact. Normal speech. Sensation intact.  PSYCH: Normal behavior. Normal affect.    LABS:                        15.5   20.90 )-----------( 664      ( 24 Mar 2021 05:18 )             47.8     03-24    139  |  102  |  32<H>  ----------------------------<  193<H>  4.9   |  26  |  1.25    Ca    9.0      24 Mar 2021 12:25  Phos  3.6     -24  Mg     2.3     -24    TPro  6.7  /  Alb  2.8<L>  /  TBili  0.4  /  DBili  x   /  AST  36  /  ALT  15  /  AlkPhos  79  03-24    PTT - ( 24 Mar 2021 12:24 )  PTT:136.0 sec  CARDIAC MARKERS ( 23 Mar 2021 09:59 )  x     / x     / 65 U/L / x     / 2.3 ng/mL      Urinalysis Basic - ( 23 Mar 2021 18:43 )    Color: Yellow / Appearance: Clear / S.020 / pH: x  Gluc: x / Ketone: Negative  / Bili: Negative / Urobili: 3 mg/dL   Blood: x / Protein: Negative / Nitrite: Negative   Leuk Esterase: Negative / RBC: 8 /hpf / WBC 1 /HPF   Sq Epi: x / Non Sq Epi: 1 /hpf / Bacteria: Negative          RADIOLOGY & ADDITIONAL TESTS:    Lab Results Reviewed  CTA chest pending

## 2021-03-24 NOTE — PROGRESS NOTE ADULT - SUBJECTIVE AND OBJECTIVE BOX
CARDIOLOGY FOLLOW UP - Dr. Pyle    CC: denies cp, sob, and palpitations       PHYSICAL EXAM:  T(C): 36.8 (03-24-21 @ 11:32), Max: 36.9 (03-24-21 @ 00:05)  HR: 70 (03-24-21 @ 11:32) (70 - 100)  BP: 131/86 (03-24-21 @ 11:32) (119/77 - 131/86)  RR: 18 (03-24-21 @ 11:32) (18 - 18)  SpO2: 97% (03-24-21 @ 11:32) (92% - 97%)  Wt(kg): --  I&O's Summary    23 Mar 2021 07:01  -  24 Mar 2021 07:00  --------------------------------------------------------  IN: 205 mL / OUT: 0 mL / NET: 205 mL        Appearance: Normal	  Cardiovascular: Normal S1 S2,RRR, No JVD, No murmurs  Respiratory: Lungs clear to auscultation	  Gastrointestinal:  Soft, Non-tender, + BS	  Extremities: Normal range of motion, No clubbing, cyanosis or edema      Home Medications:  Hyzaar 100 mg-12.5 mg oral tablet: 1 tab(s) orally once a day (22 Mar 2021 02:17)      MEDICATIONS  (STANDING):  dexAMETHasone  Injectable 6 milliGRAM(s) IV Push daily  diltiazem    Tablet 30 milliGRAM(s) Oral every 6 hours  heparin  Infusion.  Unit(s)/Hr (22 mL/Hr) IV Continuous <Continuous>  influenza   Vaccine 0.5 milliLiter(s) IntraMuscular once  remdesivir  IVPB   IV Intermittent   remdesivir  IVPB 100 milliGRAM(s) IV Intermittent every 24 hours      TELEMETRY: 	SR 80s    ECG:  	  RADIOLOGY:   DIAGNOSTIC TESTING:  [ ] Echocardiogram:  [ ]  Catheterization:  [ ] Stress Test:    OTHER: 	    LABS:	 	    Creatine Kinase, Serum: 65 U/L [30 - 200] (03-23 @ 09:59)  CKMB Units: 2.3 ng/mL [0.0 - 6.7] (03-23 @ 09:59)  Troponin T, High Sensitivity Result: <6 ng/L [0 - 51] (03-23 @ 09:59)  Creatine Kinase, Serum: 136 U/L [30 - 200] (03-22 @ 02:42)  Troponin T, High Sensitivity Result: 11 ng/L [0 - 51] (03-22 @ 02:42)  Creatine Kinase, Serum: 174 U/L [30 - 200] (03-21 @ 23:58)                          15.5   20.90 )-----------( 664      ( 24 Mar 2021 05:18 )             47.8     03-24    139  |  102  |  32<H>  ----------------------------<  193<H>  4.9   |  26  |  1.25    Ca    9.0      24 Mar 2021 12:25  Phos  3.6     03-24  Mg     2.3     03-24    TPro  6.7  /  Alb  2.8<L>  /  TBili  0.4  /  DBili  x   /  AST  36  /  ALT  15  /  AlkPhos  79  03-24    PTT - ( 24 Mar 2021 12:24 )  PTT:136.0 sec

## 2021-03-24 NOTE — PROGRESS NOTE ADULT - ASSESSMENT
65M c hx MO, HTN, recent covid vaccine (2.5 weeks ago), pw hypoxic respiratory failure likely 2/2 covid. mg Subcutaneous Daily Rakesh Estes MD       • dextrose 50 % injection 25 g  25 g Intravenous PRN Madeleine Roche, CNP       • dextrose 50 % injection 12.5 g  12.5 g Intravenous PRN Madeleine Roche, CNP       • dextrose 5 % infusion   Intravenous Continuous PRN Madeleine Roche, CNP       • glucagon (GLUCAGEN) injection 1 mg  1 mg Intramuscular PRN Madeleine Roche, CNP       • dextrose (GLUTOSE) 40 % gel 15 g  15 g Oral PRN Madeleine Roche, CNP       • dextrose (GLUTOSE) 40 % gel 30 g  30 g Oral PRN Madeleine Roche, CNP       • insulin lispro (HumaLOG) correction dose   Subcutaneous 4x Daily AC & HS Madeleine Roche, CNP       • HYDROmorphone (DILAUDID) injection 0.5 mg  0.5 mg Intravenous Q2H PRN Madeleine Roche, CNP   0.5 mg at 11/02/19 0419        I/O's    Intake/Output Summary (Last 24 hours) at 11/2/2019 1525  Last data filed at 11/2/2019 1100  Gross per 24 hour   Intake 2346.22 ml   Output 3010 ml   Net -663.78 ml       Last Recorded Vitals  Blood pressure 103/54, pulse 60, temperature 96.8 °F (36 °C), temperature source Temporal, resp. rate 23, height 5' 6\" (1.676 m), weight 120 kg (264 lb 8.8 oz), SpO2 97 %.  Body mass index is 42.7 kg/m².    Vital Signs:    Visit Vitals  /54   Pulse 60   Temp 96.8 °F (36 °C) (Temporal)   Resp 23   Ht 5' 6\" (1.676 m)   Wt 120 kg (264 lb 8.8 oz)   SpO2 97%   BMI 42.70 kg/m²     Physical Exam   Constitutional: He is oriented to person, place, and time. He appears well-developed and well-nourished.   Abdominal: Soft. He exhibits no distension. There is no tenderness.   MICHELLE-SS; incisions-clean   Genitourinary:    Genitourinary Comments: Larios-clear urine     Neurological: He is alert and oriented to person, place, and time.   Skin: Skin is warm.   Psychiatric: He has a normal mood and affect. His behavior is normal. Judgment and thought content normal.       Labs   Recent Results (from the past 24 hour(s))   perfect serve.Electronically Signed by: URVASHI GIORDANO 

## 2021-03-24 NOTE — PROVIDER CONTACT NOTE (CRITICAL VALUE NOTIFICATION) - ACTION/TREATMENT ORDERED:
Heparin drip held for 1 hr from 1502 until 1602. Restarted Heparin drip @ 2100 units/hr. APTT to be drawn @ 2102. No s/s bleeding. Bleeding precaution maintained.

## 2021-03-24 NOTE — PROGRESS NOTE ADULT - ASSESSMENT
a/p     65M c hx MO, HTN, recent covid vaccine (2.5 weeks ago), pw 9 days sore throat, and 4 days sob.  Cardiac eval for new onset afib    1. New onset Afib   -likely in setting of Covid  -s/p Lopressor IVP x2, Cardizem IVP x 1, Cardizem 30 mg PO  -pt converted to NSR - Cardizem gtt d/c  -c/w Cardizem 30 mg PO  -ChadsVasc 1: c/w hep gtt for now  -check echo to rule out LV or valvular dysfunction     2. Covid-19 -02 supp as needed   -CXR noted   -hsT neg, no acs on ekg   -cont decadron, remdesivir  -on hep gtt for elevated ddimer   -can check an echo when feasible  -mgmt per med     3. HTN  -bp stable  -cont cardizem     4. SIMEON  -cr improving s/p IVF  -f/u med    dvt ppx    plan discussed with ACP

## 2021-03-25 DIAGNOSIS — I26.99 OTHER PULMONARY EMBOLISM WITHOUT ACUTE COR PULMONALE: ICD-10-CM

## 2021-03-25 LAB
ALBUMIN SERPL ELPH-MCNC: 2.9 G/DL — LOW (ref 3.3–5)
ALP SERPL-CCNC: 61 U/L — SIGNIFICANT CHANGE UP (ref 40–120)
ALT FLD-CCNC: 11 U/L — SIGNIFICANT CHANGE UP (ref 10–45)
ANION GAP SERPL CALC-SCNC: 12 MMOL/L — SIGNIFICANT CHANGE UP (ref 5–17)
APTT BLD: 84.8 SEC — HIGH (ref 27.5–35.5)
AST SERPL-CCNC: 22 U/L — SIGNIFICANT CHANGE UP (ref 10–40)
BILIRUB SERPL-MCNC: 0.3 MG/DL — SIGNIFICANT CHANGE UP (ref 0.2–1.2)
BUN SERPL-MCNC: 33 MG/DL — HIGH (ref 7–23)
CALCIUM SERPL-MCNC: 9.2 MG/DL — SIGNIFICANT CHANGE UP (ref 8.4–10.5)
CHLORIDE SERPL-SCNC: 102 MMOL/L — SIGNIFICANT CHANGE UP (ref 96–108)
CO2 SERPL-SCNC: 26 MMOL/L — SIGNIFICANT CHANGE UP (ref 22–31)
CREAT SERPL-MCNC: 1.15 MG/DL — SIGNIFICANT CHANGE UP (ref 0.5–1.3)
FERRITIN SERPL-MCNC: 525 NG/ML — HIGH (ref 30–400)
GLUCOSE SERPL-MCNC: 158 MG/DL — HIGH (ref 70–99)
HCT VFR BLD CALC: 49.5 % — SIGNIFICANT CHANGE UP (ref 39–50)
HGB BLD-MCNC: 15.9 G/DL — SIGNIFICANT CHANGE UP (ref 13–17)
MCHC RBC-ENTMCNC: 28.4 PG — SIGNIFICANT CHANGE UP (ref 27–34)
MCHC RBC-ENTMCNC: 32.1 GM/DL — SIGNIFICANT CHANGE UP (ref 32–36)
MCV RBC AUTO: 88.6 FL — SIGNIFICANT CHANGE UP (ref 80–100)
NRBC # BLD: 0 /100 WBCS — SIGNIFICANT CHANGE UP (ref 0–0)
PLATELET # BLD AUTO: 589 K/UL — HIGH (ref 150–400)
POTASSIUM SERPL-MCNC: 4.9 MMOL/L — SIGNIFICANT CHANGE UP (ref 3.5–5.3)
POTASSIUM SERPL-SCNC: 4.9 MMOL/L — SIGNIFICANT CHANGE UP (ref 3.5–5.3)
PROT SERPL-MCNC: 6.8 G/DL — SIGNIFICANT CHANGE UP (ref 6–8.3)
RBC # BLD: 5.59 M/UL — SIGNIFICANT CHANGE UP (ref 4.2–5.8)
RBC # FLD: 14.4 % — SIGNIFICANT CHANGE UP (ref 10.3–14.5)
SODIUM SERPL-SCNC: 140 MMOL/L — SIGNIFICANT CHANGE UP (ref 135–145)
WBC # BLD: 17.71 K/UL — HIGH (ref 3.8–10.5)
WBC # FLD AUTO: 17.71 K/UL — HIGH (ref 3.8–10.5)

## 2021-03-25 PROCEDURE — 99233 SBSQ HOSP IP/OBS HIGH 50: CPT

## 2021-03-25 RX ORDER — ENOXAPARIN SODIUM 100 MG/ML
120 INJECTION SUBCUTANEOUS
Refills: 0 | Status: DISCONTINUED | OUTPATIENT
Start: 2021-03-25 | End: 2021-03-31

## 2021-03-25 RX ORDER — FUROSEMIDE 40 MG
20 TABLET ORAL ONCE
Refills: 0 | Status: DISCONTINUED | OUTPATIENT
Start: 2021-03-25 | End: 2021-03-25

## 2021-03-25 RX ADMIN — Medication 6 MILLIGRAM(S): at 05:04

## 2021-03-25 RX ADMIN — ENOXAPARIN SODIUM 120 MILLIGRAM(S): 100 INJECTION SUBCUTANEOUS at 10:47

## 2021-03-25 RX ADMIN — Medication 30 MILLIGRAM(S): at 23:21

## 2021-03-25 RX ADMIN — Medication 30 MILLIGRAM(S): at 17:04

## 2021-03-25 RX ADMIN — HEPARIN SODIUM 2400 UNIT(S)/HR: 5000 INJECTION INTRAVENOUS; SUBCUTANEOUS at 05:04

## 2021-03-25 RX ADMIN — Medication 30 MILLIGRAM(S): at 11:41

## 2021-03-25 RX ADMIN — Medication 30 MILLIGRAM(S): at 05:04

## 2021-03-25 RX ADMIN — REMDESIVIR 500 MILLIGRAM(S): 5 INJECTION INTRAVENOUS at 09:52

## 2021-03-25 RX ADMIN — ENOXAPARIN SODIUM 120 MILLIGRAM(S): 100 INJECTION SUBCUTANEOUS at 20:07

## 2021-03-25 NOTE — PROGRESS NOTE ADULT - SUBJECTIVE AND OBJECTIVE BOX
[facility] Division of Hospital Medicine  Marquez Garner MD, SUPRIYA  Pager (SHEELA-F, 8A-5P): [phone]  Other Times:  [phone]    Patient is a 65y old  Male who presents with a chief complaint of sob (25 Mar 2021 12:09)      SUBJECTIVE / OVERNIGHT EVENTS:  No events overnight. No new complaints.     MEDICATIONS  (STANDING):  dexAMETHasone  Injectable 6 milliGRAM(s) IV Push daily  diltiazem    Tablet 30 milliGRAM(s) Oral every 6 hours  enoxaparin Injectable 120 milliGRAM(s) SubCutaneous two times a day  influenza   Vaccine 0.5 milliLiter(s) IntraMuscular once  remdesivir  IVPB   IV Intermittent   remdesivir  IVPB 100 milliGRAM(s) IV Intermittent every 24 hours      I&O's Summary    25 Mar 2021 07:01  -  25 Mar 2021 19:39  --------------------------------------------------------  IN: 490 mL / OUT: 0 mL / NET: 490 mL        PHYSICAL EXAM:  Vital Signs Last 24 Hrs  T(C): 36.6 (25 Mar 2021 11:23), Max: 36.7 (24 Mar 2021 21:15)  T(F): 97.8 (25 Mar 2021 11:23), Max: 98 (24 Mar 2021 21:15)  HR: 75 (25 Mar 2021 17:03) (72 - 81)  BP: 132/85 (25 Mar 2021 17:03) (122/67 - 139/82)  BP(mean): --  RR: 18 (25 Mar 2021 11:23) (18 - 20)  SpO2: 98% (25 Mar 2021 11:23) (92% - 98%)    GENERAL: Obese man, NAD.  HEAD:  Atraumatic. Normocephalic.  EYES: EOMI. Normal conjunctiva/sclera.  ENT: Neck supple. No JVD.  CARDIAC: +tachy, irregular, S1. S2. no murmur  LUNG/CHEST: BS equal bilaterally. No wheezes. +rales  ABDOMEN: Soft. No tenderness. No distension. Normal bowel sounds.  BACK: No midline/vertebral tenderness. No flank tenderness.  EXTREMITIES:  No clubbing. No cyanosis. No edema. Moving all 4.  NEUROLOGY: A&Ox3. Non-focal exam. Cranial nerves intact. Normal speech. Sensation intact.  PSYCH: Normal behavior. Normal affect.      LABS:                        15.9   17.71 )-----------( 589      ( 25 Mar 2021 04:41 )             49.5     03-25    140  |  102  |  33<H>  ----------------------------<  158<H>  4.9   |  26  |  1.15    Ca    9.2      25 Mar 2021 04:41  Phos  3.6     03-24  Mg     2.3     03-24    TPro  6.8  /  Alb  2.9<L>  /  TBili  0.3  /  DBili  x   /  AST  22  /  ALT  11  /  AlkPhos  61  03-25    PTT - ( 25 Mar 2021 04:41 )  PTT:84.8 sec        RADIOLOGY & ADDITIONAL TESTS:    Lab Results Reviewed: inflammatory markers improving

## 2021-03-25 NOTE — PROGRESS NOTE ADULT - ASSESSMENT
65M c hx MO, HTN, recent covid vaccine (2.5 weeks ago), pw hypoxic respiratory failure due to pneumonia from COVID-19 infection and acute PE.

## 2021-03-25 NOTE — PROGRESS NOTE ADULT - SUBJECTIVE AND OBJECTIVE BOX
CARDIOLOGY FOLLOW UP - Dr. Pyle    CC: chart review no acute events noted       PHYSICAL EXAM:  T(C): 36.6 (03-25-21 @ 11:23), Max: 36.7 (03-24-21 @ 21:15)  HR: 81 (03-25-21 @ 11:23) (72 - 81)  BP: 132/92 (03-25-21 @ 11:23) (122/67 - 139/82)  RR: 18 (03-25-21 @ 11:23) (18 - 20)  SpO2: 98% (03-25-21 @ 11:23) (92% - 98%)  Wt(kg): --  I&O's Summary    25 Mar 2021 07:01  -  25 Mar 2021 12:09  --------------------------------------------------------  IN: 250 mL / OUT: 0 mL / NET: 250 mL          Home Medications:  Hyzaar 100 mg-12.5 mg oral tablet: 1 tab(s) orally once a day (22 Mar 2021 02:17)      MEDICATIONS  (STANDING):  dexAMETHasone  Injectable 6 milliGRAM(s) IV Push daily  diltiazem    Tablet 30 milliGRAM(s) Oral every 6 hours  enoxaparin Injectable 120 milliGRAM(s) SubCutaneous two times a day  influenza   Vaccine 0.5 milliLiter(s) IntraMuscular once  remdesivir  IVPB   IV Intermittent   remdesivir  IVPB 100 milliGRAM(s) IV Intermittent every 24 hours      TELEMETRY: SR 60-80	    ECG:  	  RADIOLOGY:   CT Angio Chest w/ IV Cont (03.24.21 @ 14:30)   FINDINGS:    LUNGS AND AIRWAYS: Patent centralairways.  Diffuse bilateral groundglass opacities and areas of consolidation involving all 5 lobes.  PLEURA: Trace bilateral pleural effusions.  MEDIASTINUM AND MERI: No lymphadenopathy.  VESSELS: Pulmonary embolism in the right lower lobar artery and extending into the distal vessel.  HEART: Heart size is normal. No pericardial effusion.  CHEST WALL AND LOWER NECK: Within normal limits.  VISUALIZED UPPER ABDOMEN: A right renal cyst. Surgical sutures seen along the stomach.  BONES: Mild degenerative changes.    IMPRESSION:  Pulmonary embolism in the right lower lobar artery as described above. No evidence of RV strain.  Diffuse bilateral groundglass opacities and areas of consolidation involving all 5 lobes.    These findings were discussed with NORY MAURICE  at 3/24/2021 2:58 PM by Dr. Xiong with read back confirmation.        DIAGNOSTIC TESTING:  [ ] Echocardiogram:  [ ]  Catheterization:  [ ] Stress Test:    OTHER: 	    LABS:	 	    Creatine Kinase, Serum: 65 U/L [30 - 200] (03-23 @ 09:59)  CKMB Units: 2.3 ng/mL [0.0 - 6.7] (03-23 @ 09:59)  Troponin T, High Sensitivity Result: <6 ng/L [0 - 51] (03-23 @ 09:59)  Creatine Kinase, Serum: 136 U/L [30 - 200] (03-22 @ 02:42)  Troponin T, High Sensitivity Result: 11 ng/L [0 - 51] (03-22 @ 02:42)  Creatine Kinase, Serum: 174 U/L [30 - 200] (03-21 @ 23:58)                          15.9   17.71 )-----------( 589      ( 25 Mar 2021 04:41 )             49.5     03-25    140  |  102  |  33<H>  ----------------------------<  158<H>  4.9   |  26  |  1.15    Ca    9.2      25 Mar 2021 04:41  Phos  3.6     03-24  Mg     2.3     03-24    TPro  6.8  /  Alb  2.9<L>  /  TBili  0.3  /  DBili  x   /  AST  22  /  ALT  11  /  AlkPhos  61  03-25    PTT - ( 25 Mar 2021 04:41 )  PTT:84.8 sec

## 2021-03-25 NOTE — PROGRESS NOTE ADULT - ASSESSMENT
a/p     65M c hx MO, HTN, recent covid vaccine (2.5 weeks ago), pw 9 days sore throat, and 4 days sob.  Cardiac eval for new onset afib    1. New onset Afib   -likely in setting of Covid  -s/p Lopressor IVP x2, Cardizem IVP x 1, Cardizem 30 mg PO, Cardizem gtt   -remains in NSR   -c/w Cardizem 30 mg PO  -ChadsVasc 1: c/w hep gtt for now  -check echo to rule out LV or valvular dysfunction     2. Covid-19 -02 supp as needed   -CXR noted   -hsT neg, no acs on ekg   -cont decadron, remdesivir  -on hep gtt for elevated ddimer   -can check an echo when feasible  -mgmt per med     3. HTN  -bp stable  -cont cardizem     4. SIMEON  -cr improving s/p IVF  -f/u med    5. PE  -CT chest noted with Pulmonary embolism in the right lower lobar artery as described above. No evidence of RV strain.  -c/w hep gtt  -f/u per med     dvt ppx

## 2021-03-26 LAB
ALBUMIN SERPL ELPH-MCNC: 3.1 G/DL — LOW (ref 3.3–5)
ALP SERPL-CCNC: 56 U/L — SIGNIFICANT CHANGE UP (ref 40–120)
ALT FLD-CCNC: 14 U/L — SIGNIFICANT CHANGE UP (ref 10–45)
ANION GAP SERPL CALC-SCNC: 14 MMOL/L — SIGNIFICANT CHANGE UP (ref 5–17)
AST SERPL-CCNC: 41 U/L — HIGH (ref 10–40)
BILIRUB SERPL-MCNC: 0.4 MG/DL — SIGNIFICANT CHANGE UP (ref 0.2–1.2)
BUN SERPL-MCNC: 30 MG/DL — HIGH (ref 7–23)
CALCIUM SERPL-MCNC: 9.8 MG/DL — SIGNIFICANT CHANGE UP (ref 8.4–10.5)
CHLORIDE SERPL-SCNC: 100 MMOL/L — SIGNIFICANT CHANGE UP (ref 96–108)
CO2 SERPL-SCNC: 25 MMOL/L — SIGNIFICANT CHANGE UP (ref 22–31)
CREAT SERPL-MCNC: 1.06 MG/DL — SIGNIFICANT CHANGE UP (ref 0.5–1.3)
CRP SERPL-MCNC: 16 MG/L — HIGH (ref 0–4)
FERRITIN SERPL-MCNC: 401 NG/ML — HIGH (ref 30–400)
GLUCOSE SERPL-MCNC: 144 MG/DL — HIGH (ref 70–99)
HCT VFR BLD CALC: 50.8 % — HIGH (ref 39–50)
HGB BLD-MCNC: 16.1 G/DL — SIGNIFICANT CHANGE UP (ref 13–17)
MCHC RBC-ENTMCNC: 28.4 PG — SIGNIFICANT CHANGE UP (ref 27–34)
MCHC RBC-ENTMCNC: 31.7 GM/DL — LOW (ref 32–36)
MCV RBC AUTO: 89.6 FL — SIGNIFICANT CHANGE UP (ref 80–100)
NRBC # BLD: 0 /100 WBCS — SIGNIFICANT CHANGE UP (ref 0–0)
PLATELET # BLD AUTO: 663 K/UL — HIGH (ref 150–400)
POTASSIUM SERPL-MCNC: 5.8 MMOL/L — HIGH (ref 3.5–5.3)
POTASSIUM SERPL-SCNC: 5.8 MMOL/L — HIGH (ref 3.5–5.3)
PROT SERPL-MCNC: 7.2 G/DL — SIGNIFICANT CHANGE UP (ref 6–8.3)
RBC # BLD: 5.67 M/UL — SIGNIFICANT CHANGE UP (ref 4.2–5.8)
RBC # FLD: 14.4 % — SIGNIFICANT CHANGE UP (ref 10.3–14.5)
SODIUM SERPL-SCNC: 139 MMOL/L — SIGNIFICANT CHANGE UP (ref 135–145)
WBC # BLD: 19.17 K/UL — HIGH (ref 3.8–10.5)
WBC # FLD AUTO: 19.17 K/UL — HIGH (ref 3.8–10.5)

## 2021-03-26 PROCEDURE — 99233 SBSQ HOSP IP/OBS HIGH 50: CPT

## 2021-03-26 RX ORDER — DILTIAZEM HCL 120 MG
120 CAPSULE, EXT RELEASE 24 HR ORAL DAILY
Refills: 0 | Status: DISCONTINUED | OUTPATIENT
Start: 2021-03-26 | End: 2021-03-26

## 2021-03-26 RX ORDER — DILTIAZEM HCL 120 MG
60 CAPSULE, EXT RELEASE 24 HR ORAL ONCE
Refills: 0 | Status: COMPLETED | OUTPATIENT
Start: 2021-03-26 | End: 2021-03-26

## 2021-03-26 RX ORDER — DILTIAZEM HCL 120 MG
180 CAPSULE, EXT RELEASE 24 HR ORAL DAILY
Refills: 0 | Status: DISCONTINUED | OUTPATIENT
Start: 2021-03-27 | End: 2021-03-29

## 2021-03-26 RX ORDER — DILTIAZEM HCL 120 MG
60 CAPSULE, EXT RELEASE 24 HR ORAL ONCE
Refills: 0 | Status: DISCONTINUED | OUTPATIENT
Start: 2021-03-26 | End: 2021-03-26

## 2021-03-26 RX ADMIN — Medication 6 MILLIGRAM(S): at 05:04

## 2021-03-26 RX ADMIN — Medication 120 MILLIGRAM(S): at 05:04

## 2021-03-26 RX ADMIN — Medication 60 MILLIGRAM(S): at 13:34

## 2021-03-26 RX ADMIN — REMDESIVIR 500 MILLIGRAM(S): 5 INJECTION INTRAVENOUS at 11:47

## 2021-03-26 RX ADMIN — ENOXAPARIN SODIUM 120 MILLIGRAM(S): 100 INJECTION SUBCUTANEOUS at 05:01

## 2021-03-26 RX ADMIN — ENOXAPARIN SODIUM 120 MILLIGRAM(S): 100 INJECTION SUBCUTANEOUS at 17:32

## 2021-03-26 NOTE — PROGRESS NOTE ADULT - ASSESSMENT
a/p     65M c hx MO, HTN, recent covid vaccine (2.5 weeks ago), pw 9 days sore throat, and 4 days sob.  Cardiac eval for new onset afib    1. New onset Afib   -likely in setting of Covid  -s/p Lopressor IVP x2, Cardizem IVP x 1, Cardizem 30 mg PO, Cardizem gtt   -back in afib RVR this morning with elevated rates   -Cardizem  qd inc to 180 mg QD, extra 60mg ordered for now  -ChadsVasc 1: hep gtt changed to lovenox BID per med  -check echo to rule out LV or valvular dysfunction     2. Covid-19 -02 supp as needed   -CXR noted   -hsT neg, no acs on ekg   -cont decadron, remdesivir  -on Lovenox BID for elevated ddimer   -can check an echo when feasible  -mgmt per med     3. HTN  -bp stable  -cont cardizem as above     4. SIMEON  -cr improving s/p IVF  -f/u med    5. PE  -CT chest noted with Pulmonary embolism in the right lower lobar artery as described above. No evidence of RV strain.  -c/w lovenox   -f/u per med     dvt ppx    plan discussed with ACP

## 2021-03-26 NOTE — PROGRESS NOTE ADULT - SUBJECTIVE AND OBJECTIVE BOX
[facility] Division of Hospital Medicine  Marquez Garner MD, SUPRIYA  Pager (M-F, 8A-5P): [phone]  Other Times:  [phone]    Patient is a 65y old  Male who presents with a chief complaint of sob (26 Mar 2021 12:56)      SUBJECTIVE / OVERNIGHT EVENTS:  No events overnight. No new complaints.     MEDICATIONS  (STANDING):  dexAMETHasone  Injectable 6 milliGRAM(s) IV Push daily  diltiazem    Tablet 60 milliGRAM(s) Oral once  enoxaparin Injectable 120 milliGRAM(s) SubCutaneous two times a day  influenza   Vaccine 0.5 milliLiter(s) IntraMuscular once      I&O's Summary    25 Mar 2021 07:01  -  26 Mar 2021 07:00  --------------------------------------------------------  IN: 730 mL / OUT: 0 mL / NET: 730 mL    26 Mar 2021 07:01  -  26 Mar 2021 13:31  --------------------------------------------------------  IN: 250 mL / OUT: 0 mL / NET: 250 mL        PHYSICAL EXAM:  Vital Signs Last 24 Hrs  T(C): 36.9 (26 Mar 2021 11:10), Max: 36.9 (26 Mar 2021 11:10)  T(F): 98.5 (26 Mar 2021 11:10), Max: 98.5 (26 Mar 2021 11:10)  HR: 66 (26 Mar 2021 11:10) (64 - 75)  BP: 157/70 (26 Mar 2021 11:10) (124/80 - 157/70)  BP(mean): --  RR: 18 (26 Mar 2021 11:10) (18 - 18)  SpO2: 98% (26 Mar 2021 11:10) (95% - 99%)    GENERAL: Obese man, NAD.  HEAD:  Atraumatic. Normocephalic.  EYES: EOMI. Normal conjunctiva/sclera.  ENT: Neck supple. No JVD.  CARDIAC: +tachy, irregular, S1. S2. no murmur  LUNG/CHEST: BS equal bilaterally. No wheezes. +rales  ABDOMEN: Soft. No tenderness. No distension. Normal bowel sounds.  BACK: No midline/vertebral tenderness. No flank tenderness.  EXTREMITIES:  No clubbing. No cyanosis. No edema. Moving all 4.  NEUROLOGY: A&Ox3. Non-focal exam. Cranial nerves intact. Normal speech. Sensation intact.  PSYCH: Normal behavior. Normal affect.      LABS:                        16.1   19.17 )-----------( 663      ( 26 Mar 2021 06:58 )             50.8     03-26    139  |  100  |  30<H>  ----------------------------<  144<H>  5.8<H>   |  25  |  1.06    Ca    9.8      26 Mar 2021 06:58    TPro  7.2  /  Alb  3.1<L>  /  TBili  0.4  /  DBili  x   /  AST  41<H>  /  ALT  14  /  AlkPhos  56  03-26    PTT - ( 25 Mar 2021 04:41 )  PTT:84.8 sec        RADIOLOGY & ADDITIONAL TESTS:    Lab Results Reviewed

## 2021-03-26 NOTE — PROGRESS NOTE ADULT - SUBJECTIVE AND OBJECTIVE BOX
CARDIOLOGY FOLLOW UP - Dr. Pyle    CC: pt converted back to afib w RVR overnight, denies cp, sob, and palpitations       PHYSICAL EXAM:  T(C): 36.9 (03-26-21 @ 11:10), Max: 36.9 (03-26-21 @ 11:10)  HR: 66 (03-26-21 @ 11:10) (64 - 75)  BP: 157/70 (03-26-21 @ 11:10) (124/80 - 157/70)  RR: 18 (03-26-21 @ 11:10) (18 - 18)  SpO2: 98% (03-26-21 @ 11:10) (95% - 99%)  Wt(kg): --  I&O's Summary    25 Mar 2021 07:01  -  26 Mar 2021 07:00  --------------------------------------------------------  IN: 730 mL / OUT: 0 mL / NET: 730 mL    26 Mar 2021 07:01  -  26 Mar 2021 12:56  --------------------------------------------------------  IN: 250 mL / OUT: 0 mL / NET: 250 mL        Appearance: Normal	  Cardiovascular: Normal S1 S2,irregular , No JVD, No murmurs  Respiratory: Lungs clear to auscultation	  Gastrointestinal:  Soft, Non-tender, + BS	  Extremities: Normal range of motion, No clubbing, cyanosis or edema      Home Medications:  Hyzaar 100 mg-12.5 mg oral tablet: 1 tab(s) orally once a day (22 Mar 2021 02:17)      MEDICATIONS  (STANDING):  dexAMETHasone  Injectable 6 milliGRAM(s) IV Push daily  diltiazem   CD 60 milliGRAM(s) Oral once  enoxaparin Injectable 120 milliGRAM(s) SubCutaneous two times a day  influenza   Vaccine 0.5 milliLiter(s) IntraMuscular once      TELEMETRY: afib 90-120s, with ambulation up to 160s  	    ECG:  	  RADIOLOGY:   DIAGNOSTIC TESTING:  [ ] Echocardiogram:  [ ]  Catheterization:  [ ] Stress Test:    OTHER: 	    LABS:	 	    Creatine Kinase, Serum: 65 U/L [30 - 200] (03-23 @ 09:59)  CKMB Units: 2.3 ng/mL [0.0 - 6.7] (03-23 @ 09:59)  Troponin T, High Sensitivity Result: <6 ng/L [0 - 51] (03-23 @ 09:59)  Creatine Kinase, Serum: 136 U/L [30 - 200] (03-22 @ 02:42)  Troponin T, High Sensitivity Result: 11 ng/L [0 - 51] (03-22 @ 02:42)  Creatine Kinase, Serum: 174 U/L [30 - 200] (03-21 @ 23:58)                          16.1   19.17 )-----------( 663      ( 26 Mar 2021 06:58 )             50.8     03-26    139  |  100  |  30<H>  ----------------------------<  144<H>  5.8<H>   |  25  |  1.06    Ca    9.8      26 Mar 2021 06:58    TPro  7.2  /  Alb  3.1<L>  /  TBili  0.4  /  DBili  x   /  AST  41<H>  /  ALT  14  /  AlkPhos  56  03-26    PTT - ( 25 Mar 2021 04:41 )  PTT:84.8 sec

## 2021-03-27 LAB
ALBUMIN SERPL ELPH-MCNC: 2.6 G/DL — LOW (ref 3.3–5)
ALP SERPL-CCNC: 59 U/L — SIGNIFICANT CHANGE UP (ref 40–120)
ALT FLD-CCNC: 13 U/L — SIGNIFICANT CHANGE UP (ref 10–45)
ANION GAP SERPL CALC-SCNC: 10 MMOL/L — SIGNIFICANT CHANGE UP (ref 5–17)
ANION GAP SERPL CALC-SCNC: 13 MMOL/L — SIGNIFICANT CHANGE UP (ref 5–17)
ANION GAP SERPL CALC-SCNC: 9 MMOL/L — SIGNIFICANT CHANGE UP (ref 5–17)
AST SERPL-CCNC: 28 U/L — SIGNIFICANT CHANGE UP (ref 10–40)
BILIRUB SERPL-MCNC: 0.5 MG/DL — SIGNIFICANT CHANGE UP (ref 0.2–1.2)
BUN SERPL-MCNC: 31 MG/DL — HIGH (ref 7–23)
BUN SERPL-MCNC: 32 MG/DL — HIGH (ref 7–23)
BUN SERPL-MCNC: 34 MG/DL — HIGH (ref 7–23)
CALCIUM SERPL-MCNC: 9.1 MG/DL — SIGNIFICANT CHANGE UP (ref 8.4–10.5)
CALCIUM SERPL-MCNC: 9.2 MG/DL — SIGNIFICANT CHANGE UP (ref 8.4–10.5)
CALCIUM SERPL-MCNC: 9.3 MG/DL — SIGNIFICANT CHANGE UP (ref 8.4–10.5)
CHLORIDE SERPL-SCNC: 100 MMOL/L — SIGNIFICANT CHANGE UP (ref 96–108)
CHLORIDE SERPL-SCNC: 102 MMOL/L — SIGNIFICANT CHANGE UP (ref 96–108)
CHLORIDE SERPL-SCNC: 103 MMOL/L — SIGNIFICANT CHANGE UP (ref 96–108)
CO2 SERPL-SCNC: 22 MMOL/L — SIGNIFICANT CHANGE UP (ref 22–31)
CO2 SERPL-SCNC: 26 MMOL/L — SIGNIFICANT CHANGE UP (ref 22–31)
CO2 SERPL-SCNC: 28 MMOL/L — SIGNIFICANT CHANGE UP (ref 22–31)
CREAT SERPL-MCNC: 1.06 MG/DL — SIGNIFICANT CHANGE UP (ref 0.5–1.3)
CREAT SERPL-MCNC: 1.08 MG/DL — SIGNIFICANT CHANGE UP (ref 0.5–1.3)
CREAT SERPL-MCNC: 1.14 MG/DL — SIGNIFICANT CHANGE UP (ref 0.5–1.3)
GLUCOSE SERPL-MCNC: 159 MG/DL — HIGH (ref 70–99)
GLUCOSE SERPL-MCNC: 177 MG/DL — HIGH (ref 70–99)
GLUCOSE SERPL-MCNC: 240 MG/DL — HIGH (ref 70–99)
HCT VFR BLD CALC: 53.5 % — HIGH (ref 39–50)
HGB BLD-MCNC: 16.6 G/DL — SIGNIFICANT CHANGE UP (ref 13–17)
INR BLD: 1.23 RATIO — HIGH (ref 0.88–1.16)
MCHC RBC-ENTMCNC: 28.5 PG — SIGNIFICANT CHANGE UP (ref 27–34)
MCHC RBC-ENTMCNC: 31 GM/DL — LOW (ref 32–36)
MCV RBC AUTO: 91.9 FL — SIGNIFICANT CHANGE UP (ref 80–100)
NRBC # BLD: 0 /100 WBCS — SIGNIFICANT CHANGE UP (ref 0–0)
PLATELET # BLD AUTO: 650 K/UL — HIGH (ref 150–400)
POTASSIUM SERPL-MCNC: 5 MMOL/L — SIGNIFICANT CHANGE UP (ref 3.5–5.3)
POTASSIUM SERPL-MCNC: 5.1 MMOL/L — SIGNIFICANT CHANGE UP (ref 3.5–5.3)
POTASSIUM SERPL-MCNC: 6.1 MMOL/L — HIGH (ref 3.5–5.3)
POTASSIUM SERPL-SCNC: 5 MMOL/L — SIGNIFICANT CHANGE UP (ref 3.5–5.3)
POTASSIUM SERPL-SCNC: 5.1 MMOL/L — SIGNIFICANT CHANGE UP (ref 3.5–5.3)
POTASSIUM SERPL-SCNC: 6.1 MMOL/L — HIGH (ref 3.5–5.3)
PROT SERPL-MCNC: 6.6 G/DL — SIGNIFICANT CHANGE UP (ref 6–8.3)
PROTHROM AB SERPL-ACNC: 14.6 SEC — HIGH (ref 10.6–13.6)
RBC # BLD: 5.82 M/UL — HIGH (ref 4.2–5.8)
RBC # FLD: 14.4 % — SIGNIFICANT CHANGE UP (ref 10.3–14.5)
SODIUM SERPL-SCNC: 137 MMOL/L — SIGNIFICANT CHANGE UP (ref 135–145)
SODIUM SERPL-SCNC: 138 MMOL/L — SIGNIFICANT CHANGE UP (ref 135–145)
SODIUM SERPL-SCNC: 138 MMOL/L — SIGNIFICANT CHANGE UP (ref 135–145)
WBC # BLD: 18.06 K/UL — HIGH (ref 3.8–10.5)
WBC # FLD AUTO: 18.06 K/UL — HIGH (ref 3.8–10.5)

## 2021-03-27 PROCEDURE — 93010 ELECTROCARDIOGRAM REPORT: CPT

## 2021-03-27 PROCEDURE — 99233 SBSQ HOSP IP/OBS HIGH 50: CPT

## 2021-03-27 RX ADMIN — Medication 6 MILLIGRAM(S): at 05:08

## 2021-03-27 RX ADMIN — Medication 180 MILLIGRAM(S): at 09:20

## 2021-03-27 RX ADMIN — ENOXAPARIN SODIUM 120 MILLIGRAM(S): 100 INJECTION SUBCUTANEOUS at 05:08

## 2021-03-27 RX ADMIN — ENOXAPARIN SODIUM 120 MILLIGRAM(S): 100 INJECTION SUBCUTANEOUS at 17:36

## 2021-03-27 NOTE — PROGRESS NOTE ADULT - SUBJECTIVE AND OBJECTIVE BOX
Garfield Jacques MD  Division of [facility] Medicine  Cell: [phone]  Pager: [phone]  Office: [phone] Garfield Jacques MD  Division of Hospital Medicine  Cell: [phone]  Pager: [phone]  Office: [phone]    Patient is a 65y old  Male who presents with a chief complaint of sob (27 Mar 2021 19:30)        SUBJECTIVE / OVERNIGHT EVENTS: Patient says he feels better. Denies any CP.       MEDICATIONS  (STANDING):  dexAMETHasone  Injectable 6 milliGRAM(s) IV Push daily  diltiazem    milliGRAM(s) Oral daily  enoxaparin Injectable 120 milliGRAM(s) SubCutaneous two times a day  influenza   Vaccine 0.5 milliLiter(s) IntraMuscular once    MEDICATIONS  (PRN):      Vital Signs Last 24 Hrs  T(C): 36.5 (28 Mar 2021 06:00), Max: 36.7 (27 Mar 2021 11:29)  T(F): 97.7 (28 Mar 2021 06:00), Max: 98.1 (27 Mar 2021 11:29)  HR: 61 (28 Mar 2021 06:00) (61 - 86)  BP: 120/75 (28 Mar 2021 06:00) (119/79 - 121/72)  BP(mean): --  RR: 18 (28 Mar 2021 06:00) (18 - 19)  SpO2: 93% (28 Mar 2021 06:00) (93% - 95%)  CAPILLARY BLOOD GLUCOSE        I&O's Summary    26 Mar 2021 07:01  -  27 Mar 2021 07:00  --------------------------------------------------------  IN: 250 mL / OUT: 0 mL / NET: 250 mL          PHYSICAL EXAM:   GENERAL: NAD, well-developed  HEAD:  Atraumatic, Normocephalic  EYES:  conjunctiva and sclera clear  NECK: Supple,   CHEST/LUNG: Clear to auscultation bilaterally; No wheeze  HEART: S1S2 normal. Regular rate and rhythm; No murmurs, rubs, or gallops  ABDOMEN: Soft, Nontender, Nondistended; Bowel sounds present  EXTREMITIES:   No clubbing, cyanosis, or edema  PSYCH/Neuro: AAOx3. Non-focal.   SKIN: No rashes or lesions      LABS:                        16.6   18.06 )-----------( 650      ( 27 Mar 2021 06:50 )             53.5     03-27    138  |  102  |  31<H>  ----------------------------<  240<H>  5.1   |  26  |  1.06    Ca    9.3      27 Mar 2021 17:36    TPro  6.6  /  Alb  2.6<L>  /  TBili  0.5  /  DBili  x   /  AST  28  /  ALT  13  /  AlkPhos  59  03-27    PT/INR - ( 27 Mar 2021 06:50 )   PT: 14.6 sec;   INR: 1.23 ratio               RADIOLOGY & ADDITIONAL TESTS:    Imaging Personally Reviewed:  Consultant(s) Notes Reviewed:    Care Discussed with Consultants/Other Providers:

## 2021-03-28 LAB
ALBUMIN SERPL ELPH-MCNC: 2.7 G/DL — LOW (ref 3.3–5)
ALP SERPL-CCNC: 44 U/L — SIGNIFICANT CHANGE UP (ref 40–120)
ALT FLD-CCNC: 12 U/L — SIGNIFICANT CHANGE UP (ref 10–45)
ANION GAP SERPL CALC-SCNC: 9 MMOL/L — SIGNIFICANT CHANGE UP (ref 5–17)
AST SERPL-CCNC: 17 U/L — SIGNIFICANT CHANGE UP (ref 10–40)
BILIRUB SERPL-MCNC: 0.4 MG/DL — SIGNIFICANT CHANGE UP (ref 0.2–1.2)
BUN SERPL-MCNC: 33 MG/DL — HIGH (ref 7–23)
CALCIUM SERPL-MCNC: 8.8 MG/DL — SIGNIFICANT CHANGE UP (ref 8.4–10.5)
CHLORIDE SERPL-SCNC: 103 MMOL/L — SIGNIFICANT CHANGE UP (ref 96–108)
CO2 SERPL-SCNC: 25 MMOL/L — SIGNIFICANT CHANGE UP (ref 22–31)
CREAT SERPL-MCNC: 1.01 MG/DL — SIGNIFICANT CHANGE UP (ref 0.5–1.3)
CRP SERPL-MCNC: 5 MG/L — HIGH (ref 0–4)
D DIMER BLD IA.RAPID-MCNC: 386 NG/ML DDU — HIGH
FERRITIN SERPL-MCNC: 394 NG/ML — SIGNIFICANT CHANGE UP (ref 30–400)
GLUCOSE SERPL-MCNC: 146 MG/DL — HIGH (ref 70–99)
HCT VFR BLD CALC: 48 % — SIGNIFICANT CHANGE UP (ref 39–50)
HGB BLD-MCNC: 15.1 G/DL — SIGNIFICANT CHANGE UP (ref 13–17)
MCHC RBC-ENTMCNC: 28.3 PG — SIGNIFICANT CHANGE UP (ref 27–34)
MCHC RBC-ENTMCNC: 31.5 GM/DL — LOW (ref 32–36)
MCV RBC AUTO: 89.9 FL — SIGNIFICANT CHANGE UP (ref 80–100)
NRBC # BLD: 0 /100 WBCS — SIGNIFICANT CHANGE UP (ref 0–0)
PLATELET # BLD AUTO: 556 K/UL — HIGH (ref 150–400)
POTASSIUM SERPL-MCNC: 4.7 MMOL/L — SIGNIFICANT CHANGE UP (ref 3.5–5.3)
POTASSIUM SERPL-SCNC: 4.7 MMOL/L — SIGNIFICANT CHANGE UP (ref 3.5–5.3)
PROT SERPL-MCNC: 5.9 G/DL — LOW (ref 6–8.3)
RBC # BLD: 5.34 M/UL — SIGNIFICANT CHANGE UP (ref 4.2–5.8)
RBC # FLD: 14.3 % — SIGNIFICANT CHANGE UP (ref 10.3–14.5)
SODIUM SERPL-SCNC: 137 MMOL/L — SIGNIFICANT CHANGE UP (ref 135–145)
WBC # BLD: 15.76 K/UL — HIGH (ref 3.8–10.5)
WBC # FLD AUTO: 15.76 K/UL — HIGH (ref 3.8–10.5)

## 2021-03-28 PROCEDURE — 99233 SBSQ HOSP IP/OBS HIGH 50: CPT

## 2021-03-28 RX ADMIN — Medication 180 MILLIGRAM(S): at 06:10

## 2021-03-28 RX ADMIN — ENOXAPARIN SODIUM 120 MILLIGRAM(S): 100 INJECTION SUBCUTANEOUS at 17:25

## 2021-03-28 RX ADMIN — Medication 6 MILLIGRAM(S): at 06:10

## 2021-03-28 RX ADMIN — ENOXAPARIN SODIUM 120 MILLIGRAM(S): 100 INJECTION SUBCUTANEOUS at 06:09

## 2021-03-28 NOTE — PROGRESS NOTE ADULT - SUBJECTIVE AND OBJECTIVE BOX
Patient is a 65y old  Male who presents with a chief complaint of sob (28 Mar 2021 16:32)        SUBJECTIVE / OVERNIGHT EVENTS: Patient says he feels better. Breathing better. Appetite better. Denies any pain.       MEDICATIONS  (STANDING):  dexAMETHasone  Injectable 6 milliGRAM(s) IV Push daily  diltiazem    milliGRAM(s) Oral daily  enoxaparin Injectable 120 milliGRAM(s) SubCutaneous two times a day  influenza   Vaccine 0.5 milliLiter(s) IntraMuscular once    MEDICATIONS  (PRN):      Vital Signs Last 24 Hrs  T(C): 36.9 (29 Mar 2021 07:30), Max: 36.9 (29 Mar 2021 07:30)  T(F): 98.4 (29 Mar 2021 07:30), Max: 98.4 (29 Mar 2021 07:30)  HR: 110 (29 Mar 2021 07:30) (71 - 110)  BP: 108/70 (29 Mar 2021 07:30) (108/70 - 118/78)  BP(mean): --  RR: 20 (29 Mar 2021 07:30) (18 - 20)  SpO2: 90% (29 Mar 2021 07:30) (90% - 96%)  CAPILLARY BLOOD GLUCOSE        I&O's Summary        PHYSICAL EXAM:   GENERAL: NAD, well-developed  HEAD:  Atraumatic, Normocephalic  EYES:  conjunctiva and sclera clear  NECK: Supple,   CHEST/LUNG: distant bs; No wheeze  HEART: S1S2 normal. irregular rate and rhythm; No murmurs, rubs, or gallops  ABDOMEN: Soft, obese, Nontender, Nondistended; Bowel sounds present  EXTREMITIES:   No clubbing, cyanosis, or edema  PSYCH/Neuro: AAOx3. Non-focal.   SKIN: No rashes or lesions      LABS:                        15.1   15.76 )-----------( 556      ( 28 Mar 2021 07:07 )             48.0     03-28    137  |  103  |  33<H>  ----------------------------<  146<H>  4.7   |  25  |  1.01    Ca    8.8      28 Mar 2021 07:06    TPro  5.9<L>  /  Alb  2.7<L>  /  TBili  0.4  /  DBili  x   /  AST  17  /  ALT  12  /  AlkPhos  44  03-28                RADIOLOGY & ADDITIONAL TESTS:    Imaging Personally Reviewed:  Consultant(s) Notes Reviewed:    Care Discussed with Consultants/Other Providers:

## 2021-03-29 LAB
ALBUMIN SERPL ELPH-MCNC: 2.8 G/DL — LOW (ref 3.3–5)
ALP SERPL-CCNC: 44 U/L — SIGNIFICANT CHANGE UP (ref 40–120)
ALT FLD-CCNC: 14 U/L — SIGNIFICANT CHANGE UP (ref 10–45)
ANION GAP SERPL CALC-SCNC: 12 MMOL/L — SIGNIFICANT CHANGE UP (ref 5–17)
AST SERPL-CCNC: 22 U/L — SIGNIFICANT CHANGE UP (ref 10–40)
BILIRUB SERPL-MCNC: 0.4 MG/DL — SIGNIFICANT CHANGE UP (ref 0.2–1.2)
BUN SERPL-MCNC: 35 MG/DL — HIGH (ref 7–23)
CALCIUM SERPL-MCNC: 9 MG/DL — SIGNIFICANT CHANGE UP (ref 8.4–10.5)
CHLORIDE SERPL-SCNC: 102 MMOL/L — SIGNIFICANT CHANGE UP (ref 96–108)
CO2 SERPL-SCNC: 25 MMOL/L — SIGNIFICANT CHANGE UP (ref 22–31)
CREAT SERPL-MCNC: 0.98 MG/DL — SIGNIFICANT CHANGE UP (ref 0.5–1.3)
GLUCOSE SERPL-MCNC: 133 MG/DL — HIGH (ref 70–99)
HCT VFR BLD CALC: 48.3 % — SIGNIFICANT CHANGE UP (ref 39–50)
HGB BLD-MCNC: 15.4 G/DL — SIGNIFICANT CHANGE UP (ref 13–17)
MCHC RBC-ENTMCNC: 28.3 PG — SIGNIFICANT CHANGE UP (ref 27–34)
MCHC RBC-ENTMCNC: 31.9 GM/DL — LOW (ref 32–36)
MCV RBC AUTO: 88.6 FL — SIGNIFICANT CHANGE UP (ref 80–100)
NRBC # BLD: 0 /100 WBCS — SIGNIFICANT CHANGE UP (ref 0–0)
PLATELET # BLD AUTO: 564 K/UL — HIGH (ref 150–400)
POTASSIUM SERPL-MCNC: 4.7 MMOL/L — SIGNIFICANT CHANGE UP (ref 3.5–5.3)
POTASSIUM SERPL-SCNC: 4.7 MMOL/L — SIGNIFICANT CHANGE UP (ref 3.5–5.3)
PROT SERPL-MCNC: 6 G/DL — SIGNIFICANT CHANGE UP (ref 6–8.3)
RBC # BLD: 5.45 M/UL — SIGNIFICANT CHANGE UP (ref 4.2–5.8)
RBC # FLD: 14.1 % — SIGNIFICANT CHANGE UP (ref 10.3–14.5)
SODIUM SERPL-SCNC: 139 MMOL/L — SIGNIFICANT CHANGE UP (ref 135–145)
WBC # BLD: 17.69 K/UL — HIGH (ref 3.8–10.5)
WBC # FLD AUTO: 17.69 K/UL — HIGH (ref 3.8–10.5)

## 2021-03-29 PROCEDURE — 99233 SBSQ HOSP IP/OBS HIGH 50: CPT

## 2021-03-29 RX ORDER — ACETAMINOPHEN 500 MG
650 TABLET ORAL EVERY 6 HOURS
Refills: 0 | Status: DISCONTINUED | OUTPATIENT
Start: 2021-03-29 | End: 2021-04-05

## 2021-03-29 RX ORDER — GUAIFENESIN/DEXTROMETHORPHAN 600MG-30MG
10 TABLET, EXTENDED RELEASE 12 HR ORAL EVERY 6 HOURS
Refills: 0 | Status: DISCONTINUED | OUTPATIENT
Start: 2021-03-29 | End: 2021-04-05

## 2021-03-29 RX ORDER — LANOLIN ALCOHOL/MO/W.PET/CERES
3 CREAM (GRAM) TOPICAL AT BEDTIME
Refills: 0 | Status: DISCONTINUED | OUTPATIENT
Start: 2021-03-29 | End: 2021-04-05

## 2021-03-29 RX ORDER — DILTIAZEM HCL 120 MG
240 CAPSULE, EXT RELEASE 24 HR ORAL DAILY
Refills: 0 | Status: DISCONTINUED | OUTPATIENT
Start: 2021-03-30 | End: 2021-04-05

## 2021-03-29 RX ADMIN — ENOXAPARIN SODIUM 120 MILLIGRAM(S): 100 INJECTION SUBCUTANEOUS at 17:04

## 2021-03-29 RX ADMIN — Medication 3 MILLIGRAM(S): at 21:14

## 2021-03-29 RX ADMIN — ENOXAPARIN SODIUM 120 MILLIGRAM(S): 100 INJECTION SUBCUTANEOUS at 05:05

## 2021-03-29 RX ADMIN — Medication 6 MILLIGRAM(S): at 05:05

## 2021-03-29 RX ADMIN — Medication 180 MILLIGRAM(S): at 05:14

## 2021-03-29 NOTE — DIETITIAN INITIAL EVALUATION ADULT. - PERSON TAUGHT/METHOD
Briefly reviewed Heart Healthy reduced sodium nutrition therapy with pt./verbal instruction/written material/patient instructed

## 2021-03-29 NOTE — DIETITIAN INITIAL EVALUATION ADULT. - PERTINENT MEDS FT
MEDICATIONS  (STANDING):  dexAMETHasone  Injectable 6 milliGRAM(s) IV Push daily  diltiazem    milliGRAM(s) Oral daily  enoxaparin Injectable 120 milliGRAM(s) SubCutaneous two times a day  influenza   Vaccine 0.5 milliLiter(s) IntraMuscular once  melatonin 3 milliGRAM(s) Oral at bedtime

## 2021-03-29 NOTE — PROGRESS NOTE ADULT - SUBJECTIVE AND OBJECTIVE BOX
Mohsin Khan, MD  Attending Physician, Division Of Hospital Medicine  Pager: [phone], Office: [phone]  Off hour pager: [phone]    Patient is a 65y old  Male who presents with a chief complaint of SOB    SUBJECTIVE / OVERNIGHT EVENTS:  Seen, examined the patient this am  Resting in bed, hypoxic on VM 50%- O2 sat 98%, afebrile, feels ok except exertional dyspnea. BP is stable    MEDICATIONS  (STANDING):  dexAMETHasone  Injectable 6 milliGRAM(s) IV Push daily  enoxaparin Injectable 120 milliGRAM(s) SubCutaneous two times a day  influenza   Vaccine 0.5 milliLiter(s) IntraMuscular once  melatonin 3 milliGRAM(s) Oral at bedtime    MEDICATIONS  (PRN):  acetaminophen   Tablet .. 650 milliGRAM(s) Oral every 6 hours PRN Temp greater or equal to 38C (100.4F), Mild Pain (1 - 3), Moderate Pain (4 - 6)  guaifenesin/dextromethorphan  Syrup 10 milliLiter(s) Oral every 6 hours PRN Cough      Vital Signs Last 24 Hrs  T(C): 36.4 (29 Mar 2021 11:42), Max: 36.9 (29 Mar 2021 07:30)  T(F): 97.6 (29 Mar 2021 11:42), Max: 98.4 (29 Mar 2021 07:30)  HR: 89 (29 Mar 2021 11:42) (76 - 110)  BP: 120/77 (29 Mar 2021 11:42) (108/70 - 120/77)  BP(mean): --  RR: 20 (29 Mar 2021 11:42) (18 - 20)  SpO2: 98% (29 Mar 2021 11:42) (90% - 98%)  CAPILLARY BLOOD GLUCOSE        I&O's Summary    29 Mar 2021 07:01  -  29 Mar 2021 17:17  --------------------------------------------------------  IN: 480 mL / OUT: 0 mL / NET: 480 mL        PHYSICAL EXAM:-  GENERAL: NAD, well-developed  EYES: EOMI, PERRLA, conjunctiva and sclera clear  NECK: Supple, No JVD, no thyromegaly  CHEST/LUNG: Clear to auscultation bilaterally; No wheeze  HEART: Regular rate and rhythm; S1, S2 audible, No murmurs, rubs, or gallops  ABDOMEN: Soft, Nontender, Nondistended; Bowel sounds present  EXTREMITIES:  2+ Peripheral Pulses, No clubbing, cyanosis, or edema  NEURO: AAOx3, no focal deficit      LABS:                        15.4   17.69 )-----------( 564      ( 29 Mar 2021 07:19 )             48.3     03-29    139  |  102  |  35<H>  ----------------------------<  133<H>  4.7   |  25  |  0.98    Ca    9.0      29 Mar 2021 07:19    TPro  6.0  /  Alb  2.8<L>  /  TBili  0.4  /  DBili  x   /  AST  22  /  ALT  14  /  AlkPhos  44  03-29      RADIOLOGY & ADDITIONAL TESTS:    Imaging Personally Reviewed: CXR  Consults- Card

## 2021-03-29 NOTE — PROGRESS NOTE ADULT - ASSESSMENT
a/p     65M c hx MO, HTN, recent covid vaccine (2.5 weeks ago), pw 9 days sore throat, and 4 days sob.  Cardiac eval for new onset afib    1. New onset Afib   -likely in setting of Covid, PE  -s/p Lopressor IVP x2, Cardizem IVP x 1, Cardizem 30 mg PO, Cardizem gtt   -pt noted with elevated rates at times   -c/w Cardizem  mg QD for now, if rates remain elevated and sbp stable inc as needed  -ChadsVasc 1: c/w Lovenox BID   -check echo to rule out LV or valvular dysfunction     2. Covid-19  -02 supp as needed   -CXR noted   -hsT neg, no acs on ekg   -cont decadron, remdesivir  -on Lovenox BID for elevated ddimer   -can check an echo when feasible  -mgmt per med     3. HTN  -bp stable  -cont cardizem as above     4. SIMEON  -cr improving s/p IVF  -f/u med    5. PE  -CT chest noted with Pulmonary embolism in the right lower lobar artery as described above. No evidence of RV strain.  -c/w lovenox   -f/u per med     dvt ppx    plan discussed with ACP    a/p     65M c hx MO, HTN, recent covid vaccine (2.5 weeks ago), pw 9 days sore throat, and 4 days sob.  Cardiac eval for new onset afib    1. New onset Afib   -likely in setting of Covid, PE  -s/p Lopressor IVP x2, Cardizem IVP x 1, Cardizem 30 mg PO, Cardizem gtt   -pt noted with elevated rates at times   -Cardizem  mg QD inc to 240 mg QD, if rates elevated can give additional 60 mg   -ChadsVasc 1: c/w Lovenox BID   -check echo to rule out LV or valvular dysfunction     2. Covid-19 -02 supp as needed   -CXR noted   -hsT neg, no acs on ekg   -cont decadron, remdesivir  -on Lovenox BID for elevated ddimer   -can check an echo when feasible  -mgmt per med     3. HTN  -bp stable  -cont cardizem as above     4. SIMEON  -cr improving s/p IVF  -f/u med    5. PE  -CT chest noted with Pulmonary embolism in the right lower lobar artery as described above. No evidence of RV strain.  -c/w lovenox   -f/u per med     dvt ppx    plan discussed with ACP

## 2021-03-29 NOTE — DIETITIAN INITIAL EVALUATION ADULT. - PERTINENT LABORATORY DATA
03-29 Na139 mmol/L Glu 133 mg/dL<H> K+ 4.7 mmol/L Cr  0.98 mg/dL BUN 35 mg/dL<H> Phos n/a   Alb 2.8 g/dL<L> PAB n/a

## 2021-03-29 NOTE — DIETITIAN INITIAL EVALUATION ADULT. - ORAL INTAKE PTA/DIET HISTORY
Reports poor PO intake PTA x5 days 2/2 covid-19 symptoms. At baseline, pt states no issues and no modified diet followed PTA. Endorses wt gain PTA 2/2 covid-19 quarantine months and increased PO intake.  pounds, wt gain to 273 pounds.

## 2021-03-29 NOTE — PROGRESS NOTE ADULT - SUBJECTIVE AND OBJECTIVE BOX
CARDIOLOGY FOLLOW UP - Dr. Pyle    CC: chart review, pt noted in and out of afib, currently afib 90s    no acute events noted       PHYSICAL EXAM:  T(C): 36.4 (03-29-21 @ 11:42), Max: 36.9 (03-29-21 @ 07:30)  HR: 89 (03-29-21 @ 11:42) (76 - 110)  BP: 120/77 (03-29-21 @ 11:42) (108/70 - 120/77)  RR: 20 (03-29-21 @ 11:42) (18 - 20)  SpO2: 98% (03-29-21 @ 11:42) (90% - 98%)  Wt(kg): --  I&O's Summary          Home Medications:  Hyzaar 100 mg-12.5 mg oral tablet: 1 tab(s) orally once a day (22 Mar 2021 02:17)      MEDICATIONS  (STANDING):  dexAMETHasone  Injectable 6 milliGRAM(s) IV Push daily  diltiazem    milliGRAM(s) Oral daily  enoxaparin Injectable 120 milliGRAM(s) SubCutaneous two times a day  influenza   Vaccine 0.5 milliLiter(s) IntraMuscular once  melatonin 3 milliGRAM(s) Oral at bedtime      TELEMETRY: SR -->afib w RVR 90-140s	    ECG:  	  RADIOLOGY:   DIAGNOSTIC TESTING:  [ ] Echocardiogram:  [ ]  Catheterization:  [ ] Stress Test:    OTHER: 	    LABS:	 	    Creatine Kinase, Serum: 65 U/L [30 - 200] (03-23 @ 09:59)  CKMB Units: 2.3 ng/mL [0.0 - 6.7] (03-23 @ 09:59)  Troponin T, High Sensitivity Result: <6 ng/L [0 - 51] (03-23 @ 09:59)                          15.4   17.69 )-----------( 564      ( 29 Mar 2021 07:19 )             48.3     03-29    139  |  102  |  35<H>  ----------------------------<  133<H>  4.7   |  25  |  0.98    Ca    9.0      29 Mar 2021 07:19    TPro  6.0  /  Alb  2.8<L>  /  TBili  0.4  /  DBili  x   /  AST  22  /  ALT  14  /  AlkPhos  44  03-29

## 2021-03-29 NOTE — DIETITIAN INITIAL EVALUATION ADULT. - OTHER INFO
69M PMH CAD s/p CABG 2017, HTN, HLD, DM, hx s/p CABG x3 10/17/19 c/b drainage from sternal wound and is now s/p sternal wound debridement with R pectoralis flap and L pectoralis advancement 10/30/19.  Large pleural effusion drained 11/2 in CTICU. Now recovering well on floor.    - Continue prevena vac to chest, will remove tomorrow. If patient is to be discharged today, please page and we will remove prior to discharge.  - Continue FELI drain care, monitor output. Patient to be discharged with drains, follow up with Dr. Shell for removal once output <40cc/24hr. Patient may call [phone] to schedule an appointment.   - No heavy lifting, no pushing or pulling actions w/both arms, no transferring w/b/l UE  - Rest of care per primary team  - Will follow    Plastic Surgery [id number] This admission: Pt reports appetite has improved since admission, states food is fine, just doesn't eat fish. Flow sheets show intake improved to ~60%.    Confirms NKFA, no nausea/vomiting, no difficulty chewing/swallowing, no GI distress, no micronutrient supplementation PTA, last BM 3/28.

## 2021-03-30 DIAGNOSIS — I82.402 ACUTE EMBOLISM AND THROMBOSIS OF UNSPECIFIED DEEP VEINS OF LEFT LOWER EXTREMITY: ICD-10-CM

## 2021-03-30 LAB
ALBUMIN SERPL ELPH-MCNC: 3.1 G/DL — LOW (ref 3.3–5)
ALP SERPL-CCNC: 45 U/L — SIGNIFICANT CHANGE UP (ref 40–120)
ALT FLD-CCNC: 25 U/L — SIGNIFICANT CHANGE UP (ref 10–45)
ANION GAP SERPL CALC-SCNC: 11 MMOL/L — SIGNIFICANT CHANGE UP (ref 5–17)
AST SERPL-CCNC: 31 U/L — SIGNIFICANT CHANGE UP (ref 10–40)
BILIRUB SERPL-MCNC: 0.4 MG/DL — SIGNIFICANT CHANGE UP (ref 0.2–1.2)
BUN SERPL-MCNC: 35 MG/DL — HIGH (ref 7–23)
CALCIUM SERPL-MCNC: 9.1 MG/DL — SIGNIFICANT CHANGE UP (ref 8.4–10.5)
CHLORIDE SERPL-SCNC: 100 MMOL/L — SIGNIFICANT CHANGE UP (ref 96–108)
CO2 SERPL-SCNC: 27 MMOL/L — SIGNIFICANT CHANGE UP (ref 22–31)
CREAT SERPL-MCNC: 1.09 MG/DL — SIGNIFICANT CHANGE UP (ref 0.5–1.3)
CRP SERPL-MCNC: <3 MG/L — SIGNIFICANT CHANGE UP (ref 0–4)
D DIMER BLD IA.RAPID-MCNC: 335 NG/ML DDU — HIGH
FERRITIN SERPL-MCNC: 526 NG/ML — HIGH (ref 30–400)
GLUCOSE SERPL-MCNC: 140 MG/DL — HIGH (ref 70–99)
HCT VFR BLD CALC: 51.2 % — HIGH (ref 39–50)
HGB BLD-MCNC: 16.1 G/DL — SIGNIFICANT CHANGE UP (ref 13–17)
LDH SERPL L TO P-CCNC: 320 U/L — HIGH (ref 50–242)
MCHC RBC-ENTMCNC: 28.5 PG — SIGNIFICANT CHANGE UP (ref 27–34)
MCHC RBC-ENTMCNC: 31.4 GM/DL — LOW (ref 32–36)
MCV RBC AUTO: 90.8 FL — SIGNIFICANT CHANGE UP (ref 80–100)
NRBC # BLD: 0 /100 WBCS — SIGNIFICANT CHANGE UP (ref 0–0)
PLATELET # BLD AUTO: 602 K/UL — HIGH (ref 150–400)
POTASSIUM SERPL-MCNC: 5 MMOL/L — SIGNIFICANT CHANGE UP (ref 3.5–5.3)
POTASSIUM SERPL-SCNC: 5 MMOL/L — SIGNIFICANT CHANGE UP (ref 3.5–5.3)
PROT SERPL-MCNC: 6.3 G/DL — SIGNIFICANT CHANGE UP (ref 6–8.3)
RBC # BLD: 5.64 M/UL — SIGNIFICANT CHANGE UP (ref 4.2–5.8)
RBC # FLD: 14.3 % — SIGNIFICANT CHANGE UP (ref 10.3–14.5)
SODIUM SERPL-SCNC: 138 MMOL/L — SIGNIFICANT CHANGE UP (ref 135–145)
WBC # BLD: 20.43 K/UL — HIGH (ref 3.8–10.5)
WBC # FLD AUTO: 20.43 K/UL — HIGH (ref 3.8–10.5)

## 2021-03-30 PROCEDURE — 93306 TTE W/DOPPLER COMPLETE: CPT | Mod: 26

## 2021-03-30 PROCEDURE — 93970 EXTREMITY STUDY: CPT | Mod: 26

## 2021-03-30 PROCEDURE — 99233 SBSQ HOSP IP/OBS HIGH 50: CPT

## 2021-03-30 RX ADMIN — Medication 3 MILLIGRAM(S): at 22:07

## 2021-03-30 RX ADMIN — Medication 240 MILLIGRAM(S): at 05:32

## 2021-03-30 RX ADMIN — Medication 6 MILLIGRAM(S): at 05:32

## 2021-03-30 RX ADMIN — ENOXAPARIN SODIUM 120 MILLIGRAM(S): 100 INJECTION SUBCUTANEOUS at 17:12

## 2021-03-30 RX ADMIN — ENOXAPARIN SODIUM 120 MILLIGRAM(S): 100 INJECTION SUBCUTANEOUS at 05:32

## 2021-03-30 NOTE — PROGRESS NOTE ADULT - SUBJECTIVE AND OBJECTIVE BOX
Mohsin Khan, MD  Attending Physician, Division Of Hospital Medicine  Pager: [phone], Office: [phone]  Off hour pager: [phone]    Patient is a 65y old  Male who presents with a chief complaint of SOB    SUBJECTIVE / OVERNIGHT EVENTS:  Seen, examined the patient this am  Resting in bed, no c/o chest pain, but SOB on ambulation, afebrile. BP is stable    MEDICATIONS  (STANDING):  dexAMETHasone  Injectable 6 milliGRAM(s) IV Push daily  diltiazem    milliGRAM(s) Oral daily  enoxaparin Injectable 120 milliGRAM(s) SubCutaneous two times a day  influenza   Vaccine 0.5 milliLiter(s) IntraMuscular once  melatonin 3 milliGRAM(s) Oral at bedtime    MEDICATIONS  (PRN):  acetaminophen   Tablet .. 650 milliGRAM(s) Oral every 6 hours PRN Temp greater or equal to 38C (100.4F), Mild Pain (1 - 3), Moderate Pain (4 - 6)  guaifenesin/dextromethorphan  Syrup 10 milliLiter(s) Oral every 6 hours PRN Cough      Vital Signs Last 24 Hrs  T(C): 36.6 (30 Mar 2021 11:58), Max: 36.6 (30 Mar 2021 11:53)  T(F): 97.8 (30 Mar 2021 11:58), Max: 97.8 (30 Mar 2021 11:53)  HR: 66 (30 Mar 2021 11:58) (65 - 67)  BP: 118/74 (30 Mar 2021 11:58) (118/74 - 126/81)  BP(mean): --  RR: 18 (30 Mar 2021 11:58) (18 - 20)  SpO2: 93% (30 Mar 2021 13:07) (93% - 98%)  CAPILLARY BLOOD GLUCOSE        I&O's Summary    29 Mar 2021 07:01  -  30 Mar 2021 07:00  --------------------------------------------------------  IN: 730 mL / OUT: 300 mL / NET: 430 mL        PHYSICAL EXAM:-  GENERAL: NAD, well-developed  EYES: EOMI, PERRLA, conjunctiva and sclera clear  NECK: Supple, No JVD, no thyromegaly  CHEST/LUNG: Clear to auscultation bilaterally; No wheeze  HEART: Regular rate and rhythm; S1, S2 audible, No murmurs, rubs, or gallops  ABDOMEN: Soft, Nontender, Nondistended; Bowel sounds present  EXTREMITIES:  2+ Peripheral Pulses, No clubbing, cyanosis, or edema  NEURO: AAOx3, no focal deficit      LABS:                        16.1   20.43 )-----------( 602      ( 30 Mar 2021 07:28 )             51.2     03-30    138  |  100  |  35<H>  ----------------------------<  140<H>  5.0   |  27  |  1.09    Ca    9.1      30 Mar 2021 07:28    TPro  6.3  /  Alb  3.1<L>  /  TBili  0.4  /  DBili  x   /  AST  31  /  ALT  25  /  AlkPhos  45  03-30      RADIOLOGY & ADDITIONAL TESTS:    Imaging Personally Reviewed: CTA chest, doppler LE, CXR

## 2021-03-30 NOTE — PROGRESS NOTE ADULT - ASSESSMENT
Echo 3/30/21: EF 70%, nl lv sys fx    a/p     65M c hx MO, HTN, recent covid vaccine (2.5 weeks ago), pw 9 days sore throat, and 4 days sob.  Cardiac eval for new onset afib    1. New onset Afib   -likely in setting of Covid, PE  -s/p Lopressor IVP x2, Cardizem IVP x 1, Cardizem 30 mg PO, Cardizem gtt   -pt now in NSR   -c/w Cardizem as ordered   -ChadsVasc 1: c/w Lovenox BID   -Echo noted w nl lv sys fx     2. Covid-19 -02 supp as needed, now on 5L   -CXR noted   -hsT neg, no acs on ekg   -cont decadron, s/p remdesivir  -on Lovenox BID for elevated ddimer   -Echo as above   -mgmt per med     3. HTN  -bp stable  -cont cardizem as above     4. SIMEON  -cr stable   -f/u med    5. PE  -CT chest noted with Pulmonary embolism in the right lower lobar artery as described above. No evidence of RV strain.  -c/w lovenox   -f/u per med     dvt ppx    plan discussed with ACP

## 2021-03-30 NOTE — PROGRESS NOTE ADULT - SUBJECTIVE AND OBJECTIVE BOX
CARDIOLOGY FOLLOW UP - Dr. Pyle    CC: chart review, pt converted to NSR       PHYSICAL EXAM:  T(C): 36.6 (03-30-21 @ 11:58), Max: 36.6 (03-30-21 @ 11:53)  HR: 66 (03-30-21 @ 11:58) (65 - 67)  BP: 118/74 (03-30-21 @ 11:58) (118/74 - 126/81)  RR: 18 (03-30-21 @ 11:58) (18 - 20)  SpO2: 97% (03-30-21 @ 11:58) (96% - 98%)  Wt(kg): --  I&O's Summary    29 Mar 2021 07:01  -  30 Mar 2021 07:00  --------------------------------------------------------  IN: 730 mL / OUT: 300 mL / NET: 430 mL          Home Medications:  Hyzaar 100 mg-12.5 mg oral tablet: 1 tab(s) orally once a day (22 Mar 2021 02:17)      MEDICATIONS  (STANDING):  dexAMETHasone  Injectable 6 milliGRAM(s) IV Push daily  diltiazem    milliGRAM(s) Oral daily  enoxaparin Injectable 120 milliGRAM(s) SubCutaneous two times a day  influenza   Vaccine 0.5 milliLiter(s) IntraMuscular once  melatonin 3 milliGRAM(s) Oral at bedtime      TELEMETRY: SB/SR 50-80s	    ECG:  	  RADIOLOGY:   DIAGNOSTIC TESTING:  [x ] Echocardiogram:  TTE with Doppler (w/Cont) (03.30.21 @ 08:28)   EF (Visual Estimate): 70 %  Doppler Peak Velocity (m/sec): AoV=1.1  ------------------------------------------------------------------------  Observations:  Mitral Valve: Normal mitral valve.  Aortic Valve/Aorta: Calcified aortic valve with normal  opening.  Normal aortic root size.  Left Atrium: Normal left atrium.  Left Ventricle: Endocardial visualization enhanced with  intravenous injection of Ultrasonic Enhancing Agent  (Definity).  Normal left ventricular internal dimensions and wall  thicknesses.  Normal left ventricular systolic function. No segmental  wall motion abnormalities.  Right Heart: Normal right atrium. Normal right ventricular  size and function.  Normal tricuspid valve.  Normal pulmonic valve. Minimal pulmonic regurgitation.  Pericardium/Pleura: Normal pericardium with no pericardial  effusion.  Hemodynamic: No evidence of pulmonary hypertension.  ------------------------------------------------------------------------  Conclusions:  Endocardial visualization enhanced with intravenous  injection of Ultrasonic Enhancing Agent (Definity).  Normal left ventricular systolic function. No segmental  wall motion abnormalities.      [ ]  Catheterization:  [ ] Stress Test:    OTHER: 	    LABS:	 	                            16.1   20.43 )-----------( 602      ( 30 Mar 2021 07:28 )             51.2     03-30    138  |  100  |  35<H>  ----------------------------<  140<H>  5.0   |  27  |  1.09    Ca    9.1      30 Mar 2021 07:28    TPro  6.3  /  Alb  3.1<L>  /  TBili  0.4  /  DBili  x   /  AST  31  /  ALT  25  /  AlkPhos  45  03-30

## 2021-03-31 LAB
ALBUMIN SERPL ELPH-MCNC: 3 G/DL — LOW (ref 3.3–5)
ALP SERPL-CCNC: 42 U/L — SIGNIFICANT CHANGE UP (ref 40–120)
ALT FLD-CCNC: 27 U/L — SIGNIFICANT CHANGE UP (ref 10–45)
ANION GAP SERPL CALC-SCNC: 11 MMOL/L — SIGNIFICANT CHANGE UP (ref 5–17)
AST SERPL-CCNC: 23 U/L — SIGNIFICANT CHANGE UP (ref 10–40)
BASOPHILS # BLD AUTO: 0.05 K/UL — SIGNIFICANT CHANGE UP (ref 0–0.2)
BASOPHILS NFR BLD AUTO: 0.3 % — SIGNIFICANT CHANGE UP (ref 0–2)
BILIRUB SERPL-MCNC: 0.4 MG/DL — SIGNIFICANT CHANGE UP (ref 0.2–1.2)
BUN SERPL-MCNC: 36 MG/DL — HIGH (ref 7–23)
CALCIUM SERPL-MCNC: 9.2 MG/DL — SIGNIFICANT CHANGE UP (ref 8.4–10.5)
CHLORIDE SERPL-SCNC: 101 MMOL/L — SIGNIFICANT CHANGE UP (ref 96–108)
CO2 SERPL-SCNC: 26 MMOL/L — SIGNIFICANT CHANGE UP (ref 22–31)
CREAT SERPL-MCNC: 1.06 MG/DL — SIGNIFICANT CHANGE UP (ref 0.5–1.3)
EOSINOPHIL # BLD AUTO: 0.01 K/UL — SIGNIFICANT CHANGE UP (ref 0–0.5)
EOSINOPHIL NFR BLD AUTO: 0.1 % — SIGNIFICANT CHANGE UP (ref 0–6)
GLUCOSE SERPL-MCNC: 148 MG/DL — HIGH (ref 70–99)
HCT VFR BLD CALC: 48.8 % — SIGNIFICANT CHANGE UP (ref 39–50)
HGB BLD-MCNC: 15.8 G/DL — SIGNIFICANT CHANGE UP (ref 13–17)
IMM GRANULOCYTES NFR BLD AUTO: 1.8 % — HIGH (ref 0–1.5)
LYMPHOCYTES # BLD AUTO: 13 % — SIGNIFICANT CHANGE UP (ref 13–44)
LYMPHOCYTES # BLD AUTO: 2.51 K/UL — SIGNIFICANT CHANGE UP (ref 1–3.3)
MCHC RBC-ENTMCNC: 28.9 PG — SIGNIFICANT CHANGE UP (ref 27–34)
MCHC RBC-ENTMCNC: 32.4 GM/DL — SIGNIFICANT CHANGE UP (ref 32–36)
MCV RBC AUTO: 89.4 FL — SIGNIFICANT CHANGE UP (ref 80–100)
MONOCYTES # BLD AUTO: 1.5 K/UL — HIGH (ref 0–0.9)
MONOCYTES NFR BLD AUTO: 7.8 % — SIGNIFICANT CHANGE UP (ref 2–14)
NEUTROPHILS # BLD AUTO: 14.85 K/UL — HIGH (ref 1.8–7.4)
NEUTROPHILS NFR BLD AUTO: 77 % — SIGNIFICANT CHANGE UP (ref 43–77)
NRBC # BLD: 0 /100 WBCS — SIGNIFICANT CHANGE UP (ref 0–0)
PLATELET # BLD AUTO: 577 K/UL — HIGH (ref 150–400)
POTASSIUM SERPL-MCNC: 4.6 MMOL/L — SIGNIFICANT CHANGE UP (ref 3.5–5.3)
POTASSIUM SERPL-SCNC: 4.6 MMOL/L — SIGNIFICANT CHANGE UP (ref 3.5–5.3)
PROCALCITONIN SERPL-MCNC: 0.05 NG/ML — SIGNIFICANT CHANGE UP (ref 0.02–0.1)
PROT SERPL-MCNC: 6 G/DL — SIGNIFICANT CHANGE UP (ref 6–8.3)
RBC # BLD: 5.46 M/UL — SIGNIFICANT CHANGE UP (ref 4.2–5.8)
RBC # FLD: 13.8 % — SIGNIFICANT CHANGE UP (ref 10.3–14.5)
SODIUM SERPL-SCNC: 138 MMOL/L — SIGNIFICANT CHANGE UP (ref 135–145)
WBC # BLD: 19.26 K/UL — HIGH (ref 3.8–10.5)
WBC # FLD AUTO: 19.26 K/UL — HIGH (ref 3.8–10.5)

## 2021-03-31 PROCEDURE — 99233 SBSQ HOSP IP/OBS HIGH 50: CPT

## 2021-03-31 RX ORDER — APIXABAN 2.5 MG/1
10 TABLET, FILM COATED ORAL EVERY 12 HOURS
Refills: 0 | Status: DISCONTINUED | OUTPATIENT
Start: 2021-03-31 | End: 2021-04-05

## 2021-03-31 RX ORDER — APIXABAN 2.5 MG/1
5 TABLET, FILM COATED ORAL EVERY 12 HOURS
Refills: 0 | Status: CANCELLED | OUTPATIENT
Start: 2021-04-07 | End: 2021-04-05

## 2021-03-31 RX ADMIN — APIXABAN 10 MILLIGRAM(S): 2.5 TABLET, FILM COATED ORAL at 17:22

## 2021-03-31 RX ADMIN — Medication 6 MILLIGRAM(S): at 05:32

## 2021-03-31 RX ADMIN — Medication 3 MILLIGRAM(S): at 21:13

## 2021-03-31 RX ADMIN — ENOXAPARIN SODIUM 120 MILLIGRAM(S): 100 INJECTION SUBCUTANEOUS at 05:32

## 2021-03-31 RX ADMIN — Medication 240 MILLIGRAM(S): at 05:39

## 2021-03-31 NOTE — PROGRESS NOTE ADULT - ASSESSMENT
Echo 3/30/21: EF 70%, nl lv sys fx    a/p     65M c hx MO, HTN, recent covid vaccine (2.5 weeks ago), pw 9 days sore throat, and 4 days sob.  Cardiac eval for new onset afib    1. New onset Afib   -likely in setting of Covid, PE  -s/p Lopressor IVP x2, Cardizem IVP x 1, Cardizem 30 mg PO, Cardizem gtt   -back in afib, rates stable   -c/w Cardizem as ordered   -ChadsVasc 1: c/w Eliquis   -Echo noted w nl lv sys fx     2. Covid-19 -02 supp as needed, now on 5L   -CXR noted   -hsT neg, no acs on ekg   -s/p decadron,  remdesivir  -Echo as above   -mgmt per med     3. HTN  -bp stable  -cont cardizem as above     4. SIMEON  -cr stable   -f/u med    5. PE  -CT chest noted with Pulmonary embolism in the right lower lobar artery as described above. No evidence of RV strain.  -c/w Eliquis   -f/u per med     6. DVT   -le doppler noted with There is acute, echogenic thrombus within the left posterior tibial peroneal trunk in the calf  -started on eliquis per med     dvt ppx    plan discussed with ACP

## 2021-03-31 NOTE — PROGRESS NOTE ADULT - SUBJECTIVE AND OBJECTIVE BOX
CARDIOLOGY FOLLOW UP - Dr. Pyle    CC: chart review, events noted pt converted to AF  this morning       PHYSICAL EXAM:  T(C): 36.3 (03-31-21 @ 12:08), Max: 36.5 (03-31-21 @ 04:25)  HR: 73 (03-31-21 @ 12:08) (63 - 73)  BP: 114/81 (03-31-21 @ 12:08) (114/81 - 121/79)  RR: 18 (03-31-21 @ 12:08) (18 - 18)  SpO2: 93% (03-31-21 @ 12:08) (93% - 95%)  Wt(kg): --  I&O's Summary    30 Mar 2021 07:01  -  31 Mar 2021 07:00  --------------------------------------------------------  IN: 990 mL / OUT: 0 mL / NET: 990 mL          Home Medications:  Hyzaar 100 mg-12.5 mg oral tablet: 1 tab(s) orally once a day (22 Mar 2021 02:17)      MEDICATIONS  (STANDING):  apixaban 10 milliGRAM(s) Oral every 12 hours  diltiazem    milliGRAM(s) Oral daily  influenza   Vaccine 0.5 milliLiter(s) IntraMuscular once  melatonin 3 milliGRAM(s) Oral at bedtime      TELEMETRY: SB/SR 50-60 over night --> afib 	    ECG:  	  RADIOLOGY:   VA Duplex Lower Ext Vein Scan, [place] (03.30.21 @ 13:46)   COMPARISON: None available.    TECHNIQUE: Duplex sonography of theBILATERAL LOWER extremity veins with color and spectral Doppler, with and without compression.    FINDINGS:    RIGHT:  Normal compressibility of the RIGHT common femoral, femoral and popliteal veins.  Doppler examination shows normal spontaneous and phasic flow.    LEFT:  Normal compressibility of the LEFT common femoral, femoral and popliteal veins.  Doppler examination shows normal spontaneous and phasic flow.    There is acute, echogenic thrombus within the left posterior tibial peroneal trunkin the calf.    IMPRESSION: There is acute, echogenic thrombus within the left posterior tibial peroneal trunk in the calf.      DIAGNOSTIC TESTING:  [ ] Echocardiogram:  [ ]  Catheterization:  [ ] Stress Test:    OTHER: 	    LABS:	 	                            15.8   19.26 )-----------( 577      ( 31 Mar 2021 07:10 )             48.8     03-31    138  |  101  |  36<H>  ----------------------------<  148<H>  4.6   |  26  |  1.06    Ca    9.2      31 Mar 2021 06:56    TPro  6.0  /  Alb  3.0<L>  /  TBili  0.4  /  DBili  x   /  AST  23  /  ALT  27  /  AlkPhos  42  03-31

## 2021-03-31 NOTE — PROGRESS NOTE ADULT - SUBJECTIVE AND OBJECTIVE BOX
Mohsin Khan, MD  Attending Physician, Division Of Hospital Medicine  Pager: [phone], Office: [phone]  Off hour pager: [phone]    Patient is a 65y old  Male who presents with a chief complaint of SOB    SUBJECTIVE / OVERNIGHT EVENTS:  Seen, examined the patient this am.   Resting in bed, no c/o chest pain but SOB during exertion, On O2 4L, sat 94%, afebrile    MEDICATIONS  (STANDING):  apixaban 10 milliGRAM(s) Oral every 12 hours  diltiazem    milliGRAM(s) Oral daily  influenza   Vaccine 0.5 milliLiter(s) IntraMuscular once  melatonin 3 milliGRAM(s) Oral at bedtime    MEDICATIONS  (PRN):  acetaminophen   Tablet .. 650 milliGRAM(s) Oral every 6 hours PRN Temp greater or equal to 38C (100.4F), Mild Pain (1 - 3), Moderate Pain (4 - 6)  guaifenesin/dextromethorphan  Syrup 10 milliLiter(s) Oral every 6 hours PRN Cough      Vital Signs Last 24 Hrs  T(C): 36.5 (31 Mar 2021 13:24), Max: 36.5 (31 Mar 2021 04:25)  T(F): 97.7 (31 Mar 2021 13:24), Max: 97.7 (31 Mar 2021 04:25)  HR: 104 (31 Mar 2021 13:24) (63 - 104)  BP: 125/77 (31 Mar 2021 13:24) (114/81 - 125/77)  BP(mean): --  RR: 20 (31 Mar 2021 13:24) (18 - 20)  SpO2: 94% (31 Mar 2021 15:22) (87% - 95%)  CAPILLARY BLOOD GLUCOSE        I&O's Summary    30 Mar 2021 07:01  -  31 Mar 2021 07:00  --------------------------------------------------------  IN: 990 mL / OUT: 0 mL / NET: 990 mL    31 Mar 2021 07:01  -  31 Mar 2021 16:02  --------------------------------------------------------  IN: 480 mL / OUT: 0 mL / NET: 480 mL        PHYSICAL EXAM:-  GENERAL: NAD, well-developed  EYES: EOMI, PERRLA, conjunctiva and sclera clear  NECK: Supple, No JVD, no thyromegaly  CHEST/LUNG: Clear to auscultation bilaterally; No wheeze  HEART: Regular rate and rhythm; S1, S2 audible, No murmurs, rubs, or gallops  ABDOMEN: Soft, Nontender, Nondistended; Bowel sounds present  EXTREMITIES:  2+ Peripheral Pulses, No clubbing, cyanosis, or edema  NEURO: AAOx3, no focal deficit      LABS:                        15.8   19.26 )-----------( 577      ( 31 Mar 2021 07:10 )             48.8     03-31    138  |  101  |  36<H>  ----------------------------<  148<H>  4.6   |  26  |  1.06    Ca    9.2      31 Mar 2021 06:56    TPro  6.0  /  Alb  3.0<L>  /  TBili  0.4  /  DBili  x   /  AST  23  /  ALT  27  /  AlkPhos  42  03-31              RADIOLOGY & ADDITIONAL TESTS:    Imaging Personally Reviewed: CTA chest, doppler  Consultant(s) Notes Reviewed: Card   Care Discussed with Consultants/Other Providers: Card

## 2021-04-01 PROCEDURE — 99233 SBSQ HOSP IP/OBS HIGH 50: CPT

## 2021-04-01 RX ADMIN — APIXABAN 10 MILLIGRAM(S): 2.5 TABLET, FILM COATED ORAL at 17:17

## 2021-04-01 RX ADMIN — Medication 3 MILLIGRAM(S): at 22:00

## 2021-04-01 RX ADMIN — APIXABAN 10 MILLIGRAM(S): 2.5 TABLET, FILM COATED ORAL at 05:11

## 2021-04-01 RX ADMIN — Medication 240 MILLIGRAM(S): at 05:11

## 2021-04-01 NOTE — PROGRESS NOTE ADULT - ASSESSMENT
Echo 3/30/21: EF 70%, nl lv sys fx    a/p     65M c hx MO, HTN, recent covid vaccine (2.5 weeks ago), pw 9 days sore throat, and 4 days sob.  Cardiac eval for new onset afib    1. New onset Afib   -likely in setting of Covid, PE  -s/p Lopressor IVP x2, Cardizem IVP x 1, Cardizem 30 mg PO, Cardizem gtt   -back in NSR, rates stable   -c/w Cardizem as ordered   -ChadsVasc 1: c/w Eliquis   -Echo noted w nl lv sys fx     2. Covid-19 -02 supp as needed, now on 5L   -CXR noted   -hsT neg, no acs on ekg   -s/p decadron,  remdesivir  -Echo as above   -mgmt per med     3. HTN  -bp stable  -cont cardizem as above     4. SIMEON  -cr stable   -f/u med    5. PE  -CT chest noted with Pulmonary embolism in the right lower lobar artery as described above. No evidence of RV strain.  -c/w Eliquis   -f/u per med     6. DVT   -le doppler noted with There is acute, echogenic thrombus within the left posterior tibial peroneal trunk in the calf  -AC per med -c/w Eliquis      dvt ppx

## 2021-04-01 NOTE — PROGRESS NOTE ADULT - SUBJECTIVE AND OBJECTIVE BOX
Mohsin Khan, MD  Attending Physician, Division Of Hospital Medicine  Pager: [phone], Office: [phone]  Off hour pager: [phone]    Patient is a 65y old  Male who presents with a chief complaint of SOB    SUBJECTIVE / OVERNIGHT EVENTS:  Seen, examined the patient this am  Sitting in chair, breathing improving, remains afebrile. O2 sat 94% on 4L, 85% on ambulation on 4L    MEDICATIONS  (STANDING):  apixaban 10 milliGRAM(s) Oral every 12 hours  diltiazem    milliGRAM(s) Oral daily  influenza   Vaccine 0.5 milliLiter(s) IntraMuscular once  melatonin 3 milliGRAM(s) Oral at bedtime    MEDICATIONS  (PRN):  acetaminophen   Tablet .. 650 milliGRAM(s) Oral every 6 hours PRN Temp greater or equal to 38C (100.4F), Mild Pain (1 - 3), Moderate Pain (4 - 6)  guaifenesin/dextromethorphan  Syrup 10 milliLiter(s) Oral every 6 hours PRN Cough      Vital Signs Last 24 Hrs  T(C): 36.5 (01 Apr 2021 11:38), Max: 37 (31 Mar 2021 20:18)  T(F): 97.7 (01 Apr 2021 11:38), Max: 98.6 (31 Mar 2021 20:18)  HR: 74 (01 Apr 2021 11:38) (69 - 74)  BP: 115/79 (01 Apr 2021 11:38) (114/76 - 128/82)  BP(mean): --  RR: 18 (01 Apr 2021 12:10) (18 - 20)  SpO2: 94% (01 Apr 2021 12:10) (85% - 96%)  CAPILLARY BLOOD GLUCOSE        I&O's Summary    31 Mar 2021 07:01  -  01 Apr 2021 07:00  --------------------------------------------------------  IN: 1310 mL / OUT: 0 mL / NET: 1310 mL    01 Apr 2021 07:01  -  01 Apr 2021 13:38  --------------------------------------------------------  IN: 600 mL / OUT: 0 mL / NET: 600 mL        PHYSICAL EXAM:-  GENERAL: NAD, well-developed  EYES: EOMI, PERRLA, conjunctiva and sclera clear  NECK: Supple, No JVD, no thyromegaly  CHEST/LUNG: Clear to auscultation bilaterally; No wheeze  HEART: Regular rate and rhythm; S1, S2 audible, No murmurs, rubs, or gallops  ABDOMEN: Soft, Nontender, Nondistended; Bowel sounds present  EXTREMITIES:  2+ Peripheral Pulses, No clubbing, cyanosis, or edema  NEURO: AAOx3, no focal deficit      LABS:                        15.7   16.06 )-----------( 572      ( 01 Apr 2021 07:13 )             48.6     04-01    137  |  100  |  34<H>  ----------------------------<  138<H>  4.3   |  27  |  1.04    Ca    9.2      01 Apr 2021 07:10    TPro  6.0  /  Alb  3.2<L>  /  TBili  0.4  /  DBili  x   /  AST  30  /  ALT  36  /  AlkPhos  41  04-01    RADIOLOGY & ADDITIONAL TESTS:    Imaging Personally Reviewed: CTA chest, Doppler LE  Consultant(s) Notes Reviewed:    Care Discussed with Consultants/Other Providers:

## 2021-04-01 NOTE — PROGRESS NOTE ADULT - SUBJECTIVE AND OBJECTIVE BOX
CARDIOLOGY FOLLOW UP - Dr. Pyle    CC: chart review, pt converted to NSR       PHYSICAL EXAM:  T(C): 36.4 (04-01-21 @ 05:00), Max: 37 (03-31-21 @ 20:18)  HR: 69 (04-01-21 @ 05:00) (69 - 104)  BP: 128/82 (04-01-21 @ 05:00) (114/76 - 128/82)  RR: 19 (04-01-21 @ 05:00) (18 - 20)  SpO2: 95% (04-01-21 @ 05:00) (87% - 95%)  Wt(kg): --  I&O's Summary    31 Mar 2021 07:01  -  01 Apr 2021 07:00  --------------------------------------------------------  IN: 1310 mL / OUT: 0 mL / NET: 1310 mL            Home Medications:  Hyzaar 100 mg-12.5 mg oral tablet: 1 tab(s) orally once a day (22 Mar 2021 02:17)      MEDICATIONS  (STANDING):  apixaban 10 milliGRAM(s) Oral every 12 hours  diltiazem    milliGRAM(s) Oral daily  influenza   Vaccine 0.5 milliLiter(s) IntraMuscular once  melatonin 3 milliGRAM(s) Oral at bedtime      TELEMETRY: NSR 50-60s, brief 43	    ECG:  	  RADIOLOGY:   DIAGNOSTIC TESTING:  [ ] Echocardiogram:  [ ]  Catheterization:  [ ] Stress Test:    OTHER: 	    LABS:	 	                            15.7   16.06 )-----------( 572      ( 01 Apr 2021 07:13 )             48.6     04-01    137  |  100  |  34<H>  ----------------------------<  138<H>  4.3   |  27  |  1.04    Ca    9.2      01 Apr 2021 07:10    TPro  6.0  /  Alb  3.2<L>  /  TBili  0.4  /  DBili  x   /  AST  30  /  ALT  36  /  AlkPhos  41  04-01

## 2021-04-02 LAB
ANION GAP SERPL CALC-SCNC: 10 MMOL/L — SIGNIFICANT CHANGE UP (ref 5–17)
BUN SERPL-MCNC: 35 MG/DL — HIGH (ref 7–23)
CALCIUM SERPL-MCNC: 8.7 MG/DL — SIGNIFICANT CHANGE UP (ref 8.4–10.5)
CHLORIDE SERPL-SCNC: 103 MMOL/L — SIGNIFICANT CHANGE UP (ref 96–108)
CO2 SERPL-SCNC: 26 MMOL/L — SIGNIFICANT CHANGE UP (ref 22–31)
CREAT SERPL-MCNC: 1.11 MG/DL — SIGNIFICANT CHANGE UP (ref 0.5–1.3)
GLUCOSE SERPL-MCNC: 88 MG/DL — SIGNIFICANT CHANGE UP (ref 70–99)
HCT VFR BLD CALC: 44.2 % — SIGNIFICANT CHANGE UP (ref 39–50)
HGB BLD-MCNC: 14.2 G/DL — SIGNIFICANT CHANGE UP (ref 13–17)
MCHC RBC-ENTMCNC: 28.9 PG — SIGNIFICANT CHANGE UP (ref 27–34)
MCHC RBC-ENTMCNC: 32.1 GM/DL — SIGNIFICANT CHANGE UP (ref 32–36)
MCV RBC AUTO: 90 FL — SIGNIFICANT CHANGE UP (ref 80–100)
NRBC # BLD: 0 /100 WBCS — SIGNIFICANT CHANGE UP (ref 0–0)
PLATELET # BLD AUTO: 454 K/UL — HIGH (ref 150–400)
POTASSIUM SERPL-MCNC: 4.2 MMOL/L — SIGNIFICANT CHANGE UP (ref 3.5–5.3)
POTASSIUM SERPL-SCNC: 4.2 MMOL/L — SIGNIFICANT CHANGE UP (ref 3.5–5.3)
RBC # BLD: 4.91 M/UL — SIGNIFICANT CHANGE UP (ref 4.2–5.8)
RBC # FLD: 14 % — SIGNIFICANT CHANGE UP (ref 10.3–14.5)
SODIUM SERPL-SCNC: 139 MMOL/L — SIGNIFICANT CHANGE UP (ref 135–145)
WBC # BLD: 13.85 K/UL — HIGH (ref 3.8–10.5)
WBC # FLD AUTO: 13.85 K/UL — HIGH (ref 3.8–10.5)

## 2021-04-02 PROCEDURE — 99233 SBSQ HOSP IP/OBS HIGH 50: CPT

## 2021-04-02 RX ADMIN — Medication 3 MILLIGRAM(S): at 21:06

## 2021-04-02 RX ADMIN — APIXABAN 10 MILLIGRAM(S): 2.5 TABLET, FILM COATED ORAL at 17:11

## 2021-04-02 RX ADMIN — APIXABAN 10 MILLIGRAM(S): 2.5 TABLET, FILM COATED ORAL at 05:10

## 2021-04-02 RX ADMIN — Medication 240 MILLIGRAM(S): at 05:10

## 2021-04-02 NOTE — PROGRESS NOTE ADULT - ASSESSMENT
Echo 3/30/21: EF 70%, nl lv sys fx    a/p     65M c hx MO, HTN, recent covid vaccine (2.5 weeks ago), pw 9 days sore throat, and 4 days sob.  Cardiac eval for new onset afib    1. New onset Afib   -likely in setting of Covid, PE  -s/p Lopressor IVP x2, Cardizem IVP x 1, Cardizem 30 mg PO, Cardizem gtt   -in NSR, rates stable   -c/w Cardizem as ordered   -ChadsVasc 1: c/w Eliquis   -Echo noted w nl lv sys fx     2. Covid-19 -02 supp as needed  -CXR noted   -hsT neg, no acs on ekg   -s/p decadron,  remdesivir  -Echo as above   -mgmt per med     3. HTN  -bp stable  -cont cardizem as above     4. SIMEON  -cr stable   -f/u med    5. PE  -CT chest noted with Pulmonary embolism in the right lower lobar artery as described above. No evidence of RV strain.  -c/w Eliquis   -f/u per med     6. DVT   -le doppler noted with There is acute, echogenic thrombus within the left posterior tibial peroneal trunk in the calf  -AC per med -c/w Eliquis      dvt ppx

## 2021-04-02 NOTE — PROGRESS NOTE ADULT - SUBJECTIVE AND OBJECTIVE BOX
Mohsin Khan, MD  Attending Physician, Division Of Hospital Medicine  Pager: [phone], Office: [phone]  Off hour pager: [phone]    Patient is a 65y old  Male who presents with a chief complaint of SOB     SUBJECTIVE / OVERNIGHT EVENTS:  Seen, examined the patient this am  Resting in bed, no c/o fever, chest pain but exertional dyspnea. O2 sat 93% on 4L NC, T- 98F    MEDICATIONS  (STANDING):  apixaban 10 milliGRAM(s) Oral every 12 hours  diltiazem    milliGRAM(s) Oral daily  influenza   Vaccine 0.5 milliLiter(s) IntraMuscular once  melatonin 3 milliGRAM(s) Oral at bedtime    MEDICATIONS  (PRN):  acetaminophen   Tablet .. 650 milliGRAM(s) Oral every 6 hours PRN Temp greater or equal to 38C (100.4F), Mild Pain (1 - 3), Moderate Pain (4 - 6)  guaifenesin/dextromethorphan  Syrup 10 milliLiter(s) Oral every 6 hours PRN Cough      Vital Signs Last 24 Hrs  T(C): 36.6 (02 Apr 2021 12:08), Max: 36.8 (01 Apr 2021 20:31)  T(F): 97.9 (02 Apr 2021 12:08), Max: 98.3 (01 Apr 2021 20:31)  HR: 68 (02 Apr 2021 12:08) (68 - 81)  BP: 112/72 (02 Apr 2021 12:08) (102/68 - 119/80)  BP(mean): --  RR: 18 (02 Apr 2021 12:08) (17 - 20)  SpO2: 93% (02 Apr 2021 12:08) (81% - 94%)  CAPILLARY BLOOD GLUCOSE        I&O's Summary    01 Apr 2021 07:01  -  02 Apr 2021 07:00  --------------------------------------------------------  IN: 840 mL / OUT: 0 mL / NET: 840 mL    02 Apr 2021 07:01  -  02 Apr 2021 12:59  --------------------------------------------------------  IN: 300 mL / OUT: 0 mL / NET: 300 mL        PHYSICAL EXAM:-  GENERAL: NAD, well-developed  EYES: EOMI, PERRLA, conjunctiva and sclera clear  NECK: Supple, No JVD, no thyromegaly  CHEST/LUNG: Clear to auscultation bilaterally; No wheeze  HEART: Regular rate and rhythm; S1, S2 audible, No murmurs, rubs, or gallops  ABDOMEN: Soft, Nontender, Nondistended; Bowel sounds present  EXTREMITIES:  2+ Peripheral Pulses, No clubbing, cyanosis, or edema  NEURO: AAOx3, no focal deficit      LABS:                        14.2   13.85 )-----------( 454      ( 02 Apr 2021 10:00 )             44.2     04-02    139  |  103  |  35<H>  ----------------------------<  88  4.2   |  26  |  1.11    Ca    8.7      02 Apr 2021 10:00    TPro  6.0  /  Alb  3.2<L>  /  TBili  0.4  /  DBili  x   /  AST  30  /  ALT  36  /  AlkPhos  41  04-01      RADIOLOGY & ADDITIONAL TESTS:    Imaging Personally Reviewed: CTA chest, doppler LE

## 2021-04-03 LAB
ALBUMIN SERPL ELPH-MCNC: 2.5 G/DL — LOW (ref 3.3–5)
ALP SERPL-CCNC: 41 U/L — SIGNIFICANT CHANGE UP (ref 40–120)
ALT FLD-CCNC: 33 U/L — SIGNIFICANT CHANGE UP (ref 10–45)
ANION GAP SERPL CALC-SCNC: 9 MMOL/L — SIGNIFICANT CHANGE UP (ref 5–17)
AST SERPL-CCNC: 30 U/L — SIGNIFICANT CHANGE UP (ref 10–40)
BILIRUB SERPL-MCNC: 0.4 MG/DL — SIGNIFICANT CHANGE UP (ref 0.2–1.2)
BUN SERPL-MCNC: 28 MG/DL — HIGH (ref 7–23)
CALCIUM SERPL-MCNC: 8.6 MG/DL — SIGNIFICANT CHANGE UP (ref 8.4–10.5)
CHLORIDE SERPL-SCNC: 106 MMOL/L — SIGNIFICANT CHANGE UP (ref 96–108)
CO2 SERPL-SCNC: 19 MMOL/L — LOW (ref 22–31)
CREAT SERPL-MCNC: 0.75 MG/DL — SIGNIFICANT CHANGE UP (ref 0.5–1.3)
CRP SERPL-MCNC: 6 MG/L — HIGH (ref 0–4)
FERRITIN SERPL-MCNC: 552 NG/ML — HIGH (ref 30–400)
GLUCOSE SERPL-MCNC: 103 MG/DL — HIGH (ref 70–99)
POTASSIUM SERPL-MCNC: 5.2 MMOL/L — SIGNIFICANT CHANGE UP (ref 3.5–5.3)
POTASSIUM SERPL-SCNC: 5.2 MMOL/L — SIGNIFICANT CHANGE UP (ref 3.5–5.3)
PROT SERPL-MCNC: 5.4 G/DL — LOW (ref 6–8.3)
SODIUM SERPL-SCNC: 134 MMOL/L — LOW (ref 135–145)

## 2021-04-03 PROCEDURE — 93010 ELECTROCARDIOGRAM REPORT: CPT

## 2021-04-03 PROCEDURE — 99233 SBSQ HOSP IP/OBS HIGH 50: CPT

## 2021-04-03 RX ORDER — METOPROLOL TARTRATE 50 MG
5 TABLET ORAL ONCE
Refills: 0 | Status: COMPLETED | OUTPATIENT
Start: 2021-04-03 | End: 2021-04-03

## 2021-04-03 RX ORDER — DILTIAZEM HCL 120 MG
5 CAPSULE, EXT RELEASE 24 HR ORAL ONCE
Refills: 0 | Status: COMPLETED | OUTPATIENT
Start: 2021-04-03 | End: 2021-04-03

## 2021-04-03 RX ADMIN — Medication 240 MILLIGRAM(S): at 05:12

## 2021-04-03 RX ADMIN — Medication 3 MILLIGRAM(S): at 21:15

## 2021-04-03 RX ADMIN — APIXABAN 10 MILLIGRAM(S): 2.5 TABLET, FILM COATED ORAL at 17:59

## 2021-04-03 RX ADMIN — APIXABAN 10 MILLIGRAM(S): 2.5 TABLET, FILM COATED ORAL at 05:11

## 2021-04-03 RX ADMIN — Medication 5 MILLIGRAM(S): at 22:03

## 2021-04-03 NOTE — PROVIDER CONTACT NOTE (OTHER) - ASSESSMENT
A&Ox4. C/o palpitations. Denies chest pain, dizziness, or sob. VS as charted on flow.
Patient A&O x4. VS as per flowsheet. Patient with (+) LASSITER when ambulating, O2 increased from 2L NC to 3L NC, SpO2 now 93-94%. Otherwise, patient denies chest pain, palpitations, or other discomfort.
A&Ox4. C/o palpitations. Denies chest pain, dizziness, or sob. VS as charted on flow. Pt on cardizem gtt at 5 ml/hr. hep gtt at 22 ml/hr.
aox4 asymptomatic
pt a/ox4
bp 125/77 hr 104 spo2 78% on 4LNC temp 97.7 increased patient to 6LNC while in bathroom, pt went back up to 87% on his 4lNC while back in bed.

## 2021-04-03 NOTE — PROGRESS NOTE ADULT - SUBJECTIVE AND OBJECTIVE BOX
CARDIOLOGY FOLLOW UP - Dr. Pyle    4/3/21    CC: chart review, no events noted     PHYSICAL EXAM:  T(C): 36.8 (04-03-21 @ 04:45), Max: 36.8 (04-03-21 @ 04:45)  HR: 65 (04-03-21 @ 04:45) (65 - 71)  BP: 105/72 (04-03-21 @ 04:45) (105/72 - 115/76)  RR: 18 (04-03-21 @ 04:45) (18 - 20)  SpO2: 96% (04-03-21 @ 04:45) (81% - 96%)  Wt(kg): --  I&O's Summary    02 Apr 2021 07:01  -  03 Apr 2021 07:00  --------------------------------------------------------  IN: 850 mL / OUT: 200 mL / NET: 650 mL    03 Apr 2021 07:01  -  03 Apr 2021 08:56  --------------------------------------------------------  IN: 310 mL / OUT: 0 mL / NET: 310 mL            Home Medications:  Hyzaar 100 mg-12.5 mg oral tablet: 1 tab(s) orally once a day (22 Mar 2021 02:17)      MEDICATIONS  (STANDING):  apixaban 10 milliGRAM(s) Oral every 12 hours  diltiazem    milliGRAM(s) Oral daily  influenza   Vaccine 0.5 milliLiter(s) IntraMuscular once  melatonin 3 milliGRAM(s) Oral at bedtime      TELEMETRY: SR 60-80	    ECG:  	  RADIOLOGY:   DIAGNOSTIC TESTING:  [ ] Echocardiogram:  [ ]  Catheterization:  [ ] Stress Test:    OTHER: 	    LABS:	 	                            14.2   13.85 )-----------( 454      ( 02 Apr 2021 10:00 )             44.2     04-03    134<L>  |  106  |  28<H>  ----------------------------<  103<H>  5.2   |  19<L>  |  0.75    Ca    8.6      03 Apr 2021 06:19    TPro  5.4<L>  /  Alb  2.5<L>  /  TBili  0.4  /  DBili  x   /  AST  30  /  ALT  33  /  AlkPhos  41  04-03

## 2021-04-03 NOTE — PROVIDER CONTACT NOTE (OTHER) - NAME OF MD/NP/PA/DO NOTIFIED:
Unit leadership and bed board.
Carmen Saini, NP
Daily, NP
herman)
NORY Knott
Jaqueline Jack
dr. Khan Mohsinuzzaman

## 2021-04-03 NOTE — PROGRESS NOTE ADULT - SUBJECTIVE AND OBJECTIVE BOX
Mohsin Khan, MD  Attending Physician, Division Of Hospital Medicine  Pager: [phone], Office: [phone]  Off hour pager: [phone]    Patient is a 65y old  Male who presents with a chief complaint of SOB    SUBJECTIVE / OVERNIGHT EVENTS:  Seen, examined the patient this am  Afebrile, resting in bed, no acute SOB but still requiring 4L O2 NC, O2 sat 96%, hemodynamically stable otherwise    MEDICATIONS  (STANDING):  apixaban 10 milliGRAM(s) Oral every 12 hours  diltiazem    milliGRAM(s) Oral daily  influenza   Vaccine 0.5 milliLiter(s) IntraMuscular once  melatonin 3 milliGRAM(s) Oral at bedtime    MEDICATIONS  (PRN):  acetaminophen   Tablet .. 650 milliGRAM(s) Oral every 6 hours PRN Temp greater or equal to 38C (100.4F), Mild Pain (1 - 3), Moderate Pain (4 - 6)  guaifenesin/dextromethorphan  Syrup 10 milliLiter(s) Oral every 6 hours PRN Cough      Vital Signs Last 24 Hrs  T(C): 36.8 (03 Apr 2021 04:45), Max: 36.8 (03 Apr 2021 04:45)  T(F): 98.2 (03 Apr 2021 04:45), Max: 98.2 (03 Apr 2021 04:45)  HR: 65 (03 Apr 2021 04:45) (65 - 71)  BP: 105/72 (03 Apr 2021 04:45) (105/72 - 115/76)  BP(mean): --  RR: 18 (03 Apr 2021 04:45) (18 - 18)  SpO2: 96% (03 Apr 2021 04:45) (93% - 96%)  CAPILLARY BLOOD GLUCOSE        I&O's Summary    02 Apr 2021 07:01  -  03 Apr 2021 07:00  --------------------------------------------------------  IN: 850 mL / OUT: 200 mL / NET: 650 mL    03 Apr 2021 07:01  -  03 Apr 2021 10:17  --------------------------------------------------------  IN: 310 mL / OUT: 0 mL / NET: 310 mL        PHYSICAL EXAM:-  GENERAL: NAD, well-developed  EYES: EOMI, PERRLA, conjunctiva and sclera clear  NECK: Supple, No JVD, no thyromegaly  CHEST/LUNG: Clear to auscultation bilaterally; No wheeze  HEART: Regular rate and rhythm; S1, S2 audible, No murmurs, rubs, or gallops  ABDOMEN: Soft, Nontender, Nondistended; Bowel sounds present  EXTREMITIES:  2+ Peripheral Pulses, No clubbing, cyanosis, or edema  NEURO: AAOx3, no focal deficit      LABS:                        14.2   13.85 )-----------( 454      ( 02 Apr 2021 10:00 )             44.2     04-03    134<L>  |  106  |  28<H>  ----------------------------<  103<H>  5.2   |  19<L>  |  0.75    Ca    8.6      03 Apr 2021 06:19    TPro  5.4<L>  /  Alb  2.5<L>  /  TBili  0.4  /  DBili  x   /  AST  30  /  ALT  33  /  AlkPhos  41  04-03      RADIOLOGY & ADDITIONAL TESTS:    Imaging Personally Reviewed: CXR,, CTA chest, duplex LE

## 2021-04-03 NOTE — PROVIDER CONTACT NOTE (OTHER) - ACTION/TREATMENT ORDERED:
Cardizem drip at 5 ml/hr is dced. Okay to administer PO cardizem in AM. continue to monitor.
will continue to monitor
Assessed by NP at bedside. EKG ordered and performed.
cardizem 60 mg po given,will continue to monitor pt closely.
EKG, CE/Troponin  completed. Lopressor 5 mg IVP x2 doses given. Telemetry AFIB 120's-140's. Cardizem 15mg IVP, Cardizem 30 mg po given. Cardizem drip started @ 5cc/hr.
cardiology to follow up

## 2021-04-03 NOTE — PROVIDER CONTACT NOTE (OTHER) - REASON
pt with Afib RVR
pt converted from Afib to SR 70-80s
Discontinuation of Isolation for COVID patient
convert to AF
pts heart rate afib 140s-150s 170s brief while ambulating to bathroom
Converted to Rapid AFib.
NSR --> AF

## 2021-04-03 NOTE — PROVIDER CONTACT NOTE (OTHER) - SITUATION
Patient converted from NSR --> AF at this time. HR sustaining at 130-140's bpm. Patient with new onset AF in this admission.
Patient diagnosed with Covid >10 days ago. Asymptomatic. No further isolation required.
pt converted from Afib to SR 70-80s
pt converted from SB 50-60s to AF . pt was in AF on 3/29
pt with AFIB RVR with HR up to 160-170 for 6 minutes. pt was in the bathroom trying to have a bowel movement. pt o2 sat dropped to 78% with his 4LNC.
pts heart rate 140s-150s 170s brief while ambulating to bathroom
Telemetry tech reported patient converted from NSR to rapid AFib 130'ss high as 160's.

## 2021-04-03 NOTE — PROVIDER CONTACT NOTE (OTHER) - BACKGROUND
Admitted for Acute Hypoxic Respiratory failure. COVID-19
admitted for covid + currently on 4L NC s/p remdesivir & decadron. on locenox 120mg BID for PE/DVT/AF, on 240mg of cardizem
pt adm with covid,hx afib, pe
Admitted for Acute Hypoxic Respiratory failure. COVID-19
pt admitted with covid, converted to afib this AM. pmh htn, pe, dvt, sole
Admitting dx: COVID, new onset AF  PMH: HTN

## 2021-04-03 NOTE — PROVIDER CONTACT NOTE (OTHER) - DATE AND TIME:
01-Apr-2021 10:52
26-Mar-2021 12:00
31-Mar-2021 10:34
23-Mar-2021 08:20
31-Mar-2021 13:25
03-Apr-2021 20:10
24-Mar-2021 04:22

## 2021-04-04 LAB
ANION GAP SERPL CALC-SCNC: 9 MMOL/L — SIGNIFICANT CHANGE UP (ref 5–17)
BUN SERPL-MCNC: 24 MG/DL — HIGH (ref 7–23)
CALCIUM SERPL-MCNC: 8.4 MG/DL — SIGNIFICANT CHANGE UP (ref 8.4–10.5)
CHLORIDE SERPL-SCNC: 106 MMOL/L — SIGNIFICANT CHANGE UP (ref 96–108)
CO2 SERPL-SCNC: 25 MMOL/L — SIGNIFICANT CHANGE UP (ref 22–31)
CREAT SERPL-MCNC: 0.89 MG/DL — SIGNIFICANT CHANGE UP (ref 0.5–1.3)
D DIMER BLD IA.RAPID-MCNC: 190 NG/ML DDU — SIGNIFICANT CHANGE UP
FERRITIN SERPL-MCNC: 466 NG/ML — HIGH (ref 30–400)
GLUCOSE SERPL-MCNC: 144 MG/DL — HIGH (ref 70–99)
HCT VFR BLD CALC: 45 % — SIGNIFICANT CHANGE UP (ref 39–50)
HGB BLD-MCNC: 14.5 G/DL — SIGNIFICANT CHANGE UP (ref 13–17)
MAGNESIUM SERPL-MCNC: 2.1 MG/DL — SIGNIFICANT CHANGE UP (ref 1.6–2.6)
MCHC RBC-ENTMCNC: 28.5 PG — SIGNIFICANT CHANGE UP (ref 27–34)
MCHC RBC-ENTMCNC: 32.2 GM/DL — SIGNIFICANT CHANGE UP (ref 32–36)
MCV RBC AUTO: 88.6 FL — SIGNIFICANT CHANGE UP (ref 80–100)
NRBC # BLD: 0 /100 WBCS — SIGNIFICANT CHANGE UP (ref 0–0)
PLATELET # BLD AUTO: 436 K/UL — HIGH (ref 150–400)
POTASSIUM SERPL-MCNC: 4.2 MMOL/L — SIGNIFICANT CHANGE UP (ref 3.5–5.3)
POTASSIUM SERPL-SCNC: 4.2 MMOL/L — SIGNIFICANT CHANGE UP (ref 3.5–5.3)
RBC # BLD: 5.08 M/UL — SIGNIFICANT CHANGE UP (ref 4.2–5.8)
RBC # FLD: 13.9 % — SIGNIFICANT CHANGE UP (ref 10.3–14.5)
SODIUM SERPL-SCNC: 140 MMOL/L — SIGNIFICANT CHANGE UP (ref 135–145)
WBC # BLD: 17.23 K/UL — HIGH (ref 3.8–10.5)
WBC # FLD AUTO: 17.23 K/UL — HIGH (ref 3.8–10.5)

## 2021-04-04 PROCEDURE — 99233 SBSQ HOSP IP/OBS HIGH 50: CPT

## 2021-04-04 RX ORDER — SENNA PLUS 8.6 MG/1
2 TABLET ORAL AT BEDTIME
Refills: 0 | Status: DISCONTINUED | OUTPATIENT
Start: 2021-04-04 | End: 2021-04-05

## 2021-04-04 RX ORDER — POLYETHYLENE GLYCOL 3350 17 G/17G
17 POWDER, FOR SOLUTION ORAL DAILY
Refills: 0 | Status: DISCONTINUED | OUTPATIENT
Start: 2021-04-04 | End: 2021-04-05

## 2021-04-04 RX ORDER — COLCHICINE 0.6 MG
1.2 TABLET ORAL ONCE
Refills: 0 | Status: COMPLETED | OUTPATIENT
Start: 2021-04-04 | End: 2021-04-04

## 2021-04-04 RX ADMIN — Medication 1.2 MILLIGRAM(S): at 22:18

## 2021-04-04 RX ADMIN — Medication 3 MILLIGRAM(S): at 21:34

## 2021-04-04 RX ADMIN — APIXABAN 10 MILLIGRAM(S): 2.5 TABLET, FILM COATED ORAL at 17:38

## 2021-04-04 RX ADMIN — Medication 240 MILLIGRAM(S): at 05:19

## 2021-04-04 RX ADMIN — SENNA PLUS 2 TABLET(S): 8.6 TABLET ORAL at 21:34

## 2021-04-04 RX ADMIN — POLYETHYLENE GLYCOL 3350 17 GRAM(S): 17 POWDER, FOR SOLUTION ORAL at 12:22

## 2021-04-04 RX ADMIN — APIXABAN 10 MILLIGRAM(S): 2.5 TABLET, FILM COATED ORAL at 05:19

## 2021-04-04 NOTE — PROGRESS NOTE ADULT - SUBJECTIVE AND OBJECTIVE BOX
CARDIOLOGY FOLLOW UP - Dr. Pyle  4/4/21  CC: events noted, pt back and forth btwn afib and NSR, pt currently in NSR w stable rates       PHYSICAL EXAM:  T(C): 37 (04-04-21 @ 04:52), Max: 37.3 (04-03-21 @ 12:02)  HR: 105 (04-04-21 @ 04:52) (70 - 126)  BP: 110/73 (04-04-21 @ 04:52) (94/65 - 150/81)  RR: 18 (04-04-21 @ 04:52) (18 - 18)  SpO2: 95% (04-04-21 @ 04:52) (87% - 97%)  Wt(kg): --  I&O's Summary    03 Apr 2021 07:01  -  04 Apr 2021 07:00  --------------------------------------------------------  IN: 610 mL / OUT: 0 mL / NET: 610 mL        Home Medications:  Hyzaar 100 mg-12.5 mg oral tablet: 1 tab(s) orally once a day (22 Mar 2021 02:17)      MEDICATIONS  (STANDING):  apixaban 10 milliGRAM(s) Oral every 12 hours  diltiazem    milliGRAM(s) Oral daily  influenza   Vaccine 0.5 milliLiter(s) IntraMuscular once  melatonin 3 milliGRAM(s) Oral at bedtime      TELEMETRY: afib up to 130s w ambulation, NSR 50-70s	    ECG:  	  RADIOLOGY:   DIAGNOSTIC TESTING:  [ ] Echocardiogram:  [ ]  Catheterization:  [ ] Stress Test:    OTHER: 	    LABS:	 	                            14.5   17.23 )-----------( 436      ( 04 Apr 2021 06:00 )             45.0     04-04    140  |  106  |  24<H>  ----------------------------<  144<H>  4.2   |  25  |  0.89    Ca    8.4      04 Apr 2021 06:00  Mg     2.1     04-04    TPro  5.4<L>  /  Alb  2.5<L>  /  TBili  0.4  /  DBili  x   /  AST  30  /  ALT  33  /  AlkPhos  41  04-03

## 2021-04-04 NOTE — PROGRESS NOTE ADULT - ASSESSMENT
Echo 3/30/21: EF 70%, nl lv sys fx    a/p     65M c hx MO, HTN, recent covid vaccine (2.5 weeks ago), pw 9 days sore throat, and 4 days sob.  Cardiac eval for new onset afib    1. New onset Afib   -likely in setting of Covid, PE  -s/p Lopressor IVP x2, Cardizem IVP x 1, Cardizem 30 mg PO, Cardizem gtt   -afib rates expected to be mildly elevated given increase in oxygen demand   -currently in NSR, rates stable   -c/w Cardizem as ordered   -ChadsVas 1: c/w Eliquis   -Echo noted w nl lv sys fx     2. Covid-19 -02 supp as needed  -CXR noted   -hsT neg, no acs on ekg   -s/p decadron,  remdesivir  -Echo as above   -mgmt per med     3. HTN  -bp stable  -cont cardizem as above     4. SIMEON  -cr stable   -f/u med    5. PE  -CT chest noted with Pulmonary embolism in the right lower lobar artery as described above. No evidence of RV strain.  -c/w Eliquis   -f/u per med     6. DVT   -le doppler noted with There is acute, echogenic thrombus within the left posterior tibial peroneal trunk in the calf  -AC per med -c/w Eliquis      dvt ppx

## 2021-04-04 NOTE — PROGRESS NOTE ADULT - SUBJECTIVE AND OBJECTIVE BOX
Mohsin Khan, MD  Attending Physician, Division Of Hospital Medicine  Pager: [phone], Office: [phone]  Off hour pager: [phone]    Patient is a 65y old  Male who presents with a chief complaint of SOB    SUBJECTIVE / OVERNIGHT EVENTS:  Feels ok, overall better, still hypoxic O2 95% on 2L NC, afebrile, O2 sat down to 87% on ambulation,   Noted RVR at time    MEDICATIONS  (STANDING):  apixaban 10 milliGRAM(s) Oral every 12 hours  diltiazem    milliGRAM(s) Oral daily  influenza   Vaccine 0.5 milliLiter(s) IntraMuscular once  melatonin 3 milliGRAM(s) Oral at bedtime  polyethylene glycol 3350 17 Gram(s) Oral daily  senna 2 Tablet(s) Oral at bedtime    MEDICATIONS  (PRN):  acetaminophen   Tablet .. 650 milliGRAM(s) Oral every 6 hours PRN Temp greater or equal to 38C (100.4F), Mild Pain (1 - 3), Moderate Pain (4 - 6)  guaifenesin/dextromethorphan  Syrup 10 milliLiter(s) Oral every 6 hours PRN Cough      Vital Signs Last 24 Hrs  T(C): 36.8 (04 Apr 2021 11:57), Max: 37 (03 Apr 2021 20:05)  T(F): 98.2 (04 Apr 2021 11:57), Max: 98.6 (03 Apr 2021 20:05)  HR: 92 (04 Apr 2021 11:57) (92 - 126)  BP: 126/82 (04 Apr 2021 11:57) (94/65 - 150/81)  BP(mean): --  RR: 18 (04 Apr 2021 11:57) (18 - 18)  SpO2: 95% (04 Apr 2021 11:57) (87% - 96%)  CAPILLARY BLOOD GLUCOSE        I&O's Summary    03 Apr 2021 07:01  -  04 Apr 2021 07:00  --------------------------------------------------------  IN: 610 mL / OUT: 0 mL / NET: 610 mL    04 Apr 2021 07:01  -  04 Apr 2021 13:33  --------------------------------------------------------  IN: 310 mL / OUT: 0 mL / NET: 310 mL        PHYSICAL EXAM:-  GENERAL: NAD, well-developed  EYES: EOMI, PERRLA, conjunctiva and sclera clear  NECK: Supple, No JVD, no thyromegaly  CHEST/LUNG: Clear to auscultation bilaterally; No wheeze  HEART: Regular rate and rhythm; S1, S2 audible, No murmurs, rubs, or gallops  ABDOMEN: Soft, Nontender, Nondistended; Bowel sounds present  EXTREMITIES:  2+ Peripheral Pulses, No clubbing, cyanosis, or edema  NEURO: AAOx3, no focal deficit      LABS:                        14.5   17.23 )-----------( 436      ( 04 Apr 2021 06:00 )             45.0     04-04    140  |  106  |  24<H>  ----------------------------<  144<H>  4.2   |  25  |  0.89    Ca    8.4      04 Apr 2021 06:00  Mg     2.1     04-04    TPro  5.4<L>  /  Alb  2.5<L>  /  TBili  0.4  /  DBili  x   /  AST  30  /  ALT  33  /  AlkPhos  41  04-03      RADIOLOGY & ADDITIONAL TESTS:    Imaging Personally Reviewed: CXR, Echo, CTA chest  Consultant(s) Notes Reviewed: card   Care Discussed with Consultants/Other Providers: Card

## 2021-04-05 ENCOUNTER — TRANSCRIPTION ENCOUNTER (OUTPATIENT)
Age: 66
End: 2021-04-05

## 2021-04-05 VITALS
TEMPERATURE: 98 F | DIASTOLIC BLOOD PRESSURE: 76 MMHG | HEART RATE: 94 BPM | RESPIRATION RATE: 18 BRPM | SYSTOLIC BLOOD PRESSURE: 126 MMHG | OXYGEN SATURATION: 95 %

## 2021-04-05 PROCEDURE — 93005 ELECTROCARDIOGRAM TRACING: CPT

## 2021-04-05 PROCEDURE — 85610 PROTHROMBIN TIME: CPT

## 2021-04-05 PROCEDURE — 86803 HEPATITIS C AB TEST: CPT

## 2021-04-05 PROCEDURE — 85379 FIBRIN DEGRADATION QUANT: CPT

## 2021-04-05 PROCEDURE — 82570 ASSAY OF URINE CREATININE: CPT

## 2021-04-05 PROCEDURE — 83880 ASSAY OF NATRIURETIC PEPTIDE: CPT

## 2021-04-05 PROCEDURE — 93970 EXTREMITY STUDY: CPT

## 2021-04-05 PROCEDURE — 84443 ASSAY THYROID STIM HORMONE: CPT

## 2021-04-05 PROCEDURE — 99239 HOSP IP/OBS DSCHRG MGMT >30: CPT

## 2021-04-05 PROCEDURE — 84295 ASSAY OF SERUM SODIUM: CPT

## 2021-04-05 PROCEDURE — 83605 ASSAY OF LACTIC ACID: CPT

## 2021-04-05 PROCEDURE — 84300 ASSAY OF URINE SODIUM: CPT

## 2021-04-05 PROCEDURE — 82947 ASSAY GLUCOSE BLOOD QUANT: CPT

## 2021-04-05 PROCEDURE — 84132 ASSAY OF SERUM POTASSIUM: CPT

## 2021-04-05 PROCEDURE — 84100 ASSAY OF PHOSPHORUS: CPT

## 2021-04-05 PROCEDURE — 84484 ASSAY OF TROPONIN QUANT: CPT

## 2021-04-05 PROCEDURE — 86140 C-REACTIVE PROTEIN: CPT

## 2021-04-05 PROCEDURE — 80048 BASIC METABOLIC PNL TOTAL CA: CPT

## 2021-04-05 PROCEDURE — 81001 URINALYSIS AUTO W/SCOPE: CPT

## 2021-04-05 PROCEDURE — 86769 SARS-COV-2 COVID-19 ANTIBODY: CPT

## 2021-04-05 PROCEDURE — 85730 THROMBOPLASTIN TIME PARTIAL: CPT

## 2021-04-05 PROCEDURE — 83036 HEMOGLOBIN GLYCOSYLATED A1C: CPT

## 2021-04-05 PROCEDURE — 84156 ASSAY OF PROTEIN URINE: CPT

## 2021-04-05 PROCEDURE — 85014 HEMATOCRIT: CPT

## 2021-04-05 PROCEDURE — 71275 CT ANGIOGRAPHY CHEST: CPT

## 2021-04-05 PROCEDURE — 71045 X-RAY EXAM CHEST 1 VIEW: CPT

## 2021-04-05 PROCEDURE — 85025 COMPLETE CBC W/AUTO DIFF WBC: CPT

## 2021-04-05 PROCEDURE — 83735 ASSAY OF MAGNESIUM: CPT

## 2021-04-05 PROCEDURE — 82553 CREATINE MB FRACTION: CPT

## 2021-04-05 PROCEDURE — 83615 LACTATE (LD) (LDH) ENZYME: CPT

## 2021-04-05 PROCEDURE — 96374 THER/PROPH/DIAG INJ IV PUSH: CPT

## 2021-04-05 PROCEDURE — 85027 COMPLETE CBC AUTOMATED: CPT

## 2021-04-05 PROCEDURE — 85018 HEMOGLOBIN: CPT

## 2021-04-05 PROCEDURE — U0003: CPT

## 2021-04-05 PROCEDURE — 80053 COMPREHEN METABOLIC PANEL: CPT

## 2021-04-05 PROCEDURE — 82728 ASSAY OF FERRITIN: CPT

## 2021-04-05 PROCEDURE — U0005: CPT

## 2021-04-05 PROCEDURE — 82550 ASSAY OF CK (CPK): CPT

## 2021-04-05 PROCEDURE — 82435 ASSAY OF BLOOD CHLORIDE: CPT

## 2021-04-05 PROCEDURE — 82803 BLOOD GASES ANY COMBINATION: CPT

## 2021-04-05 PROCEDURE — C8929: CPT

## 2021-04-05 PROCEDURE — 82330 ASSAY OF CALCIUM: CPT

## 2021-04-05 PROCEDURE — 84540 ASSAY OF URINE/UREA-N: CPT

## 2021-04-05 PROCEDURE — 99285 EMERGENCY DEPT VISIT HI MDM: CPT

## 2021-04-05 PROCEDURE — 84145 PROCALCITONIN (PCT): CPT

## 2021-04-05 RX ORDER — LOSARTAN/HYDROCHLOROTHIAZIDE 100MG-25MG
1 TABLET ORAL
Qty: 0 | Refills: 0 | DISCHARGE

## 2021-04-05 RX ORDER — APIXABAN 2.5 MG/1
1 TABLET, FILM COATED ORAL
Qty: 0 | Refills: 0 | DISCHARGE
Start: 2021-04-05 | End: 2021-04-06

## 2021-04-05 RX ORDER — ACETAMINOPHEN 500 MG
2 TABLET ORAL
Qty: 0 | Refills: 0 | DISCHARGE
Start: 2021-04-05

## 2021-04-05 RX ORDER — APIXABAN 2.5 MG/1
2 TABLET, FILM COATED ORAL
Qty: 68 | Refills: 0
Start: 2021-04-05 | End: 2021-04-06

## 2021-04-05 RX ORDER — POLYETHYLENE GLYCOL 3350 17 G/17G
17 POWDER, FOR SOLUTION ORAL
Qty: 0 | Refills: 0 | DISCHARGE
Start: 2021-04-05

## 2021-04-05 RX ORDER — DILTIAZEM HCL 120 MG
1 CAPSULE, EXT RELEASE 24 HR ORAL
Qty: 30 | Refills: 0
Start: 2021-04-05 | End: 2021-05-04

## 2021-04-05 RX ORDER — GUAIFENESIN/DEXTROMETHORPHAN 600MG-30MG
10 TABLET, EXTENDED RELEASE 12 HR ORAL
Qty: 0 | Refills: 0 | DISCHARGE
Start: 2021-04-05

## 2021-04-05 RX ORDER — LANOLIN ALCOHOL/MO/W.PET/CERES
1 CREAM (GRAM) TOPICAL
Qty: 0 | Refills: 0 | DISCHARGE
Start: 2021-04-05

## 2021-04-05 RX ORDER — SENNA PLUS 8.6 MG/1
2 TABLET ORAL
Qty: 0 | Refills: 0 | DISCHARGE
Start: 2021-04-05

## 2021-04-05 RX ADMIN — Medication 240 MILLIGRAM(S): at 05:23

## 2021-04-05 RX ADMIN — APIXABAN 10 MILLIGRAM(S): 2.5 TABLET, FILM COATED ORAL at 05:23

## 2021-04-05 NOTE — PROGRESS NOTE ADULT - PROBLEM SELECTOR PROBLEM 5
Atrial fibrillation with RVR
Essential hypertension
SIMEON (acute kidney injury)
Atrial fibrillation with RVR
SIMEON (acute kidney injury)
SIMEON (acute kidney injury)
Essential hypertension
Atrial fibrillation with RVR
SIMEON (acute kidney injury)
SIMEON (acute kidney injury)
Atrial fibrillation with RVR

## 2021-04-05 NOTE — DISCHARGE NOTE PROVIDER - PROVIDER TOKENS
PROVIDER:[TOKEN:[[id number]],FOLLOWUP:[2 weeks]],PROVIDER:[TOKEN:[[id number]],FOLLOWUP:[1 week]]

## 2021-04-05 NOTE — PROGRESS NOTE ADULT - TIME BILLING
agree with the above assessment and plan by MONISHA Godinez.  now in NSR  cont CCB  cont AC  cont current mgmt of COVID  ECHO when feasible
Agree with above NP note.  cv stable  d.c planning  cont current tx
Agree with above NP note.  cv stable  recurrent paf in setting of acute covid infection, PE  increase CCB as tolerated  cont A/C   await echo   cont tx of covid per medicine
Agree with above NP note.  cv stable  cont current tx
Agree with above NP note.  cv stable  dilt increased for better rate control   med f/y  cont a/c
Agree with above NP note.  back in afib w RVR during ambulation/hypoxia  cont cardizem as tolerated  AC for afib/pe  care per medicine
Agree with above NP note.  cv stable  cont current tx
Agree with above NP note.  cv stable  now in NSR  med f/y  cont a/c
Agree with above NP note.  cv stable  paf in setting of acute covid infection, PE  cont A/C with iv heparin  await echo   cont tx of covid per medicine   cont po cardizem can change to cd 120 qd
Agree with above NP note.  cv stable  some tachycardia noted w ambulation  overall acceptable  cont current dose of cardizem  cont supplem O2  no objection to DC
agree with above NP note.  cv stable  in sr  cont ccb  cont a/c per medicine

## 2021-04-05 NOTE — PHARMACOTHERAPY INTERVENTION NOTE - COMMENTS
Patient is a 65yoM with PMHx HTN diagnosed with PE and AF:    Labs:   Scr: 0.89  [id number]: 1    Current Medication:   apixaban 10 mg every 12 hours for 7 days then 5 mg every 12 hours   diltiazem  milliGRAM(s) Oral daily    A/P:  > ACP sent prescriptions for dilitazem and apixaban to [facility]. Co-payment $4.00. Patient able to afford medications and instructed to  prior to discharge. Patient instructeed to follow-up outpatient for more refills.   > Patient educated on oral anticoagulation including indication and side effects. Patient instructed to avoid medications that can increase bleeding risk while on apixaban (i.e. NSAIDs).  > All questions addressed. Patient to stop home medication (losartan) upon discharge.    Nadiya Camp, Pharm.D., [facility]  Clinical Pharmacy Specialist  [phone]

## 2021-04-05 NOTE — PROGRESS NOTE ADULT - SUBJECTIVE AND OBJECTIVE BOX
Mohsin Khan, MD  Attending Physician, Division Of Hospital Medicine  Pager: [phone], Office: [phone]  Off hour pager: [phone]    Patient is a 65y old  Male who presents with a chief complaint of SOB    SUBJECTIVE / OVERNIGHT EVENTS:  Seen, examined the patient this am  Feels ok, no c/o chest pain, fever, chills, gets SOB on exertion at time, O2 sat 96% on 2L, BP stable    MEDICATIONS  (STANDING):  apixaban 10 milliGRAM(s) Oral every 12 hours  diltiazem    milliGRAM(s) Oral daily  influenza   Vaccine 0.5 milliLiter(s) IntraMuscular once  melatonin 3 milliGRAM(s) Oral at bedtime  polyethylene glycol 3350 17 Gram(s) Oral daily  senna 2 Tablet(s) Oral at bedtime    MEDICATIONS  (PRN):  acetaminophen   Tablet .. 650 milliGRAM(s) Oral every 6 hours PRN Temp greater or equal to 38C (100.4F), Mild Pain (1 - 3), Moderate Pain (4 - 6)  guaifenesin/dextromethorphan  Syrup 10 milliLiter(s) Oral every 6 hours PRN Cough      Vital Signs Last 24 Hrs  T(C): 37.1 (05 Apr 2021 11:35), Max: 37.1 (05 Apr 2021 11:35)  T(F): 98.8 (05 Apr 2021 11:35), Max: 98.8 (05 Apr 2021 11:35)  HR: 88 (05 Apr 2021 11:35) (88 - 97)  BP: 120/84 (05 Apr 2021 11:35) (116/79 - 129/88)  BP(mean): --  RR: 18 (05 Apr 2021 11:35) (18 - 22)  SpO2: 96% (05 Apr 2021 11:35) (84% - 98%)  CAPILLARY BLOOD GLUCOSE        I&O's Summary    04 Apr 2021 07:01  -  05 Apr 2021 07:00  --------------------------------------------------------  IN: 1350 mL / OUT: 0 mL / NET: 1350 mL    05 Apr 2021 07:01  -  05 Apr 2021 13:40  --------------------------------------------------------  IN: 600 mL / OUT: 0 mL / NET: 600 mL        PHYSICAL EXAM:-  GENERAL: NAD, well-developed  EYES: EOMI, PERRLA, conjunctiva and sclera clear  NECK: Supple, No JVD, no thyromegaly  CHEST/LUNG: Clear to auscultation bilaterally; No wheeze  HEART: Regular rate and rhythm; S1, S2 audible, No murmurs, rubs, or gallops  ABDOMEN: Soft, Nontender, Nondistended; Bowel sounds present  EXTREMITIES:  2+ Peripheral Pulses, No clubbing, cyanosis, or edema  NEURO: AAOx3, no focal deficit      LABS:                        14.5   17.23 )-----------( 436      ( 04 Apr 2021 06:00 )             45.0     04-04    140  |  106  |  24<H>  ----------------------------<  144<H>  4.2   |  25  |  0.89    Ca    8.4      04 Apr 2021 06:00  Mg     2.1     04-04      RADIOLOGY & ADDITIONAL TESTS:    Imaging Personally Reviewed: CTA chest, doppler LE

## 2021-04-05 NOTE — PROGRESS NOTE ADULT - PROBLEM SELECTOR PROBLEM 6
SIMEON (acute kidney injury)
Essential hypertension

## 2021-04-05 NOTE — DISCHARGE NOTE PROVIDER - NSDCMRMEDTOKEN_GEN_ALL_CORE_FT
acetaminophen 325 mg oral tablet: 2 tab(s) orally every 6 hours, As needed, Temp greater or equal to 38C (100.4F), Mild Pain (1 - 3), Moderate Pain (4 - 6)  apixaban 5 mg oral tablet: 2 tab(s) orally every 12 hours through 4/7/21 then 1 tab orally every 12 hours beginning 4/8/21   dilTIAZem 240 mg/24 hours oral capsule, extended release: 1 cap(s) orally once a day  guaifenesin-dextromethorphan 100 mg-10 mg/5 mL oral liquid: 10 milliliter(s) orally every 6 hours, As needed, Cough  melatonin 3 mg oral tablet: 1 tab(s) orally once a day (at bedtime)  polyethylene glycol 3350 oral powder for reconstitution: 17 gram(s) orally once a day  senna oral tablet: 2 tab(s) orally once a day (at bedtime)

## 2021-04-05 NOTE — PROGRESS NOTE ADULT - NSICDXPILOT_GEN_ALL_CORE
[place]

## 2021-04-05 NOTE — PROGRESS NOTE ADULT - SUBJECTIVE AND OBJECTIVE BOX
CARDIOLOGY FOLLOW UP - Dr. Pyle    CC no acute events    awaiting dc     REVIEW OF SYSTEMS:  CONSTITUTIONAL: No fever, weight loss, or fatigue  RESPIRATORY: No cough, wheezing, chills or hemoptysis; No Shortness of Breath  CARDIOVASCULAR: No chest pain, palpitations, passing out, dizziness, or leg swelling  GASTROINTESTINAL: No abdominal or epigastric pain. No nausea, vomiting, or hematemesis; No diarrhea or constipation. No melena or hematochezia.  VASCULAR: No edema     PHYSICAL EXAM:  T(C): 37.1 (04-05-21 @ 11:35), Max: 37.1 (04-05-21 @ 11:35)  HR: 88 (04-05-21 @ 11:35) (88 - 97)  BP: 120/84 (04-05-21 @ 11:35) (116/79 - 129/88)  RR: 18 (04-05-21 @ 11:35) (18 - 22)  SpO2: 96% (04-05-21 @ 11:35) (84% - 98%)  Wt(kg): --  I&O's Summary    04 Apr 2021 07:01  -  05 Apr 2021 07:00  --------------------------------------------------------  IN: 1350 mL / OUT: 0 mL / NET: 1350 mL        Appearance: Normal	  Cardiovascular: Normal S1 S2,RRR, No JVD, No murmurs  Respiratory: Lungs clear to auscultation	  Gastrointestinal:  Soft, Non-tender, + BS	  Extremities: Normal range of motion, No clubbing, cyanosis or edema      Home Medications:  acetaminophen 325 mg oral tablet: 2 tab(s) orally every 6 hours, As needed, Temp greater or equal to 38C (100.4F), Mild Pain (1 - 3), Moderate Pain (4 - 6) (05 Apr 2021 11:36)  guaifenesin-dextromethorphan 100 mg-10 mg/5 mL oral liquid: 10 milliliter(s) orally every 6 hours, As needed, Cough (05 Apr 2021 11:36)  melatonin 3 mg oral tablet: 1 tab(s) orally once a day (at bedtime) (05 Apr 2021 11:36)  polyethylene glycol 3350 oral powder for reconstitution: 17 gram(s) orally once a day (05 Apr 2021 11:36)  senna oral tablet: 2 tab(s) orally once a day (at bedtime) (05 Apr 2021 11:36)      MEDICATIONS  (STANDING):  apixaban 10 milliGRAM(s) Oral every 12 hours  diltiazem    milliGRAM(s) Oral daily  influenza   Vaccine 0.5 milliLiter(s) IntraMuscular once  melatonin 3 milliGRAM(s) Oral at bedtime  polyethylene glycol 3350 17 Gram(s) Oral daily  senna 2 Tablet(s) Oral at bedtime      TELEMETRY: nsr 	    ECG:  	  RADIOLOGY:   DIAGNOSTIC TESTING:  [ ] Echocardiogram:  [ ]  Catheterization:  [ ] Stress Test:    OTHER: 	    LABS:	 	                            14.5   17.23 )-----------( 436      ( 04 Apr 2021 06:00 )             45.0     04-04    140  |  106  |  24<H>  ----------------------------<  144<H>  4.2   |  25  |  0.89    Ca    8.4      04 Apr 2021 06:00  Mg     2.1     04-04

## 2021-04-05 NOTE — PROGRESS NOTE ADULT - PROBLEM SELECTOR PLAN 1
Currently back in NSR since ~4am.   Cardizem gtt stopped.   Obtain TTE  On heparin gtt for possibility of PE  TSH within normal limits
- due to COVID-19 pneumonia and acute PE. (see treatment below)  - cont O2 supplementation. Currently requiring NRB 15L/min
- due to COVID-19 pneumonia and acute PE. (see treatment below)  - cont O2 supplementation. Currently requiring NRB 15L/min - attempt to titrate down if tolerates
- due to COVID-19 pneumonia and acute PE. (see treatment below)  - cont O2 supplementation. was requiring NRB 15L/min - attempt to titrate down if tolerates - now on Ventimask. -Wean as tolerated.   -Incentive spirometer and OOB to chair.
Developed spontaneously this morning. Rate not really improving after lopressor IV pushes.   Consulted cardiology.   Starting cardizem gtt for now  Obtain TTE  Already on heparin gtt for possibility of PE  TSH within normal limits
Due to COVID-19 pneumonia and acute PE. Afebrile, still hypoxic- On 2L O2 via NC. O2 sat 96% on 2L at rest, 84% off O2 in rest. On ambulation 90% on 4L.   - d/c home today on home O2 2-4L, c/w bronchodilators, incentive spirometry  - Outpatient f/u with PCP, Pulmonary, Cardiology  ** d/c time 43 min
Due to COVID-19 pneumonia and acute PE. Afebrile, still hypoxic- On 4L O2 via NC. O2 sat 96% on ambulation 85% on 4L yesterday. Will get O2 sat on ambulation again today  - PE and COVID treatments as below  - Incentive spirometer and OOB to chair.  - d/c planning in progress on home O2
Still hypoxic- On 4L O2 via NC. Due to COVID-19 pneumonia and acute PE  - PE and COVID treatments as below  - Incentive spirometer and OOB to chair.
Due to COVID-19 pneumonia and acute PE  - On VM 50%, O2 sat 98%, PE and COVID treatments as below  -Incentive spirometer and OOB to chair.
Still hypoxic- On 4L O2 via NC. O2 sat 94%, on ambulation 85% on 4L. Due to COVID-19 pneumonia and acute PE  - PE and COVID treatments as below  - Incentive spirometer and OOB to chair.  - d/c planning in progress on home O2
Due to COVID-19 pneumonia and acute PE. Afebrile, still hypoxic- On 2L O2 via NC. O2 sat 95% on 2L at rest, 87% on ambulation. Will get O2 sat on ambulation again in am  - PE and COVID treatments as below  - Incentive spirometer and OOB to chair.  - d/c planning in progress if stable tomorrow on home O2
- due to COVID-19 pneumonia and acute PE. (see treatment below)  - cont O2 supplementation. was requiring NRB 15L/min - attempt to titrate down if tolerates - now on Ventimask.
Afebrile, still hypoxic- On 4L O2 via NC. O2 sat 93%, on ambulation 85% on 4L. Due to COVID-19 pneumonia and acute PE  - PE and COVID treatments as below  - Incentive spirometer and OOB to chair.  - d/c planning in progress on home O2
Due to COVID-19 pneumonia and acute PE  - On O2 via NC 6L today, O2 sat 97%, PE and COVID treatments as below  - Incentive spirometer and OOB to chair.

## 2021-04-05 NOTE — PROGRESS NOTE ADULT - PROBLEM SELECTOR PLAN 2
- Continue Remdesivir and decadron day 4  - monitor inflammatory markers
- b/l diffuse lung opacities on cxr, concerning for covid PNA  - given elevated d-dimer, tachycardia, q3t3, will empirically start hep gtt for poss PE  - If SIMEON continue to improve, tomorrow will consider CTA chest to definitively rule in/out acute PE.   - cont O2 supplementation.
Afebrile, hypoxic off oxygen, Ferritin 490, Was septic on admission from COVID PNA, now sepsis resolved, elvated WBC is from dexamethasone  - completed IV Remdesivir and decadron courses,   - On Eliquis, O2 support, incentive spirometer    ** spoke to Sister Aimee the plan of care
Afebrile, hypoxic, WBC 13 ( Dexamethasone), Ferritin 526, CRP <3, Was septic on admission from COVID PNA, now sepsis resolved  - completed IV Remdesivir and decadron courses,   - On Eliquis, O2 support, incentive spirometer  - downtrending  inflammatory markers  ** spoke to Sister Aimee the plan of care
Afebrile, hypoxic, WBC 19 ( Dexamethasone), Ferritin 526, CRP <3  - completed IV Remdesivir, c/w decadron courses, Eliquis, O2 support  - monitor inflammatory markers
- b/l diffuse lung opacities on cxr, concerning for covid PNA  - given elevated d-dimer, tachycardia, q3t3, empirically started hep gtt for poss PE  - SIMEON improved, Cr normal today. Ordered CTA Chest today.  - cont O2 supplementation.
Afebrile, hypoxic, WBC 17 ( Dexamethasone), Ferritin 490, Was septic on admission from COVID PNA, now sepsis resolved  - completed IV Remdesivir and decadron courses,   - On Eliquis, O2 support, incentive spirometer  - downtrending  inflammatory markers  ** spoke to Sister Aimee the plan of care
- Continue Remdesivir and decadron day 3  - monitor inflammatory markers
Afebrile, hypoxic, WBC 13 ( Dexamethasone), Ferritin 526, CRP <3, Was septic on admission from COVID PNA, now sepsis resolved  - completed IV Remdesivir, c/w decadron courses, Eliquis, O2 support  - monitor inflammatory markers  ** spoke to Sister Aimee the plan of care
- Continue Remdesivir and decadron   - monitor inflammatory markers
- Continue Remdesivir and decadron courses.  - monitor inflammatory markers  -Supportive care.
Afebrile, hypoxic, WBC 17.   - completed IV Remdesivir, c/w decadron courses.  - monitor inflammatory markers
Afebrile, hypoxic, WBC 16 ( Dexamethasone), Ferritin 526, CRP <3, Was septic on admission from COVID PNA, now sepsis resolved  - completed IV Remdesivir, c/w decadron courses, Eliquis, O2 support  - monitor inflammatory markers
Afebrile, hypoxic, WBC 17.   - Continue Remdesivir and decadron courses.  - monitor inflammatory markers

## 2021-04-05 NOTE — DISCHARGE NOTE PROVIDER - HOSPITAL COURSE
65M c hx MO, HTN, recent covid vaccine (2.5 weeks ago), pw hypoxic respiratory failure due to pneumonia from COVID-19 infection and acute PE.     Problem/Plan - 1:  ·  Problem: Acute respiratory failure with hypoxia.  Plan: Due to COVID-19 pneumonia and acute PE. Afebrile, still hypoxic- On 2L O2 via NC. O2 sat 95% on 2L at rest, 87% on ambulation. Will get O2 sat on ambulation again in am  - PE and COVID treatments as below  - Incentive spirometer and OOB to chair.  - d/c planning in progress if stable tomorrow on home O2.      Problem/Plan - 2:  ·  Problem: COVID-19.  Plan: Afebrile, hypoxic, WBC 17 ( Dexamethasone), Ferritin 490, Was septic on admission from COVID PNA, now sepsis resolved  - completed IV Remdesivir and decadron courses,   - On Eliquis, O2 support, incentive spirometer  - downtrending  inflammatory markers  ** spoke to Sister Aimee the plan of care.      Problem/Plan - 3:  ·  Problem: Acute pulmonary embolism.  Plan: Likely due to COVID-19 infection. TTE showed normal EF 70%, no Right heart strains LE duplex showed LLE posterior tibial peroneal trunk DVT.  - On Eliquis 10mg bid for 7 days and then 5mg bid   - Outpatient f/u with PCP/Pulmonary.      Problem/Plan - 4:  ·  Problem: Acute deep vein thrombosis (DVT) of left lower extremity, unspecified vein.  Plan: LE duplex showed LLE posterior tibial peroneal trunk DVT  - c/w Eliquis as above.      Problem/Plan - 5:  ·  Problem: Atrial fibrillation with RVR.  Plan: Noted RVR last night, got extra dose of IV Cardizem per NP. Afib - rate 80s. Normal TSH. TTE showed normal EF 70%, no RV strains  - Spoke to cardiology, recommended to c/w Cardizem CD 240mg/d and Eliquis    outpatient f/u in a week.      Problem/Plan - 6:  Problem: Essential hypertension. Plan: BP has been stable  - c/w Cardizem CD 240mg/d.    D/C to home with home O2   This is a 65M with h/o MO, HTN, recent covid vaccine (2.5 weeks ago) a/w SOB, found to have Acute PE on CTA chest and hypoxic respiratory failure due to COVID-19 pneumonia. He was admitted in COVID floor, monitored and plan of care is outlined as below-    1. Acute respiratory failure with hypoxia- due to COVID-19 pneumonia and acute PE.   2. Acute PE and LLE LLE posterior tibial peroneal trunk DVT  3. COVID pneumonia  4. New onset a.fib with RVR    - The patient was started on weight based Lovenox sc bid, oxygen support, Remdesivir & dexamethasone, incentive spirometry. Echo showed normal EF 70%, no right heart strains. LE duplex showed LLE posterior tibial peroneal trunk DVT. Cardiology evaluated him, started on Cardizem CD 240mg/d, converted to SR with controlled rate. Over the course he recovered from hypoxia and maintained 96% o2 on ambulation on RA. Lovenox was switched to Eliquis. He remained hemodynamically stable and was discharged with instructions to follow-up with Cardiologist in a week to 10 days and PCP for further care as outpatient.

## 2021-04-05 NOTE — PROGRESS NOTE ADULT - PROBLEM SELECTOR PROBLEM 4
Acute deep vein thrombosis (DVT) of left lower extremity, unspecified vein
Acute deep vein thrombosis (DVT) of left lower extremity, unspecified vein
Atrial fibrillation with RVR
SIMEON (acute kidney injury)
Acute deep vein thrombosis (DVT) of left lower extremity, unspecified vein
Atrial fibrillation with RVR
SIMEON (acute kidney injury)
Atrial fibrillation with RVR
Acute deep vein thrombosis (DVT) of left lower extremity, unspecified vein
Acute deep vein thrombosis (DVT) of left lower extremity, unspecified vein

## 2021-04-05 NOTE — PROGRESS NOTE ADULT - PROBLEM SELECTOR PLAN 4
LE duplex showed LLE posterior tibial peroneal trunk DVT  - c/w Eliquis as above
- likely ATN due to severe dehydration (present on admission)  - s/p IVF. Currently able to take in enough fluids orally.   - Cr seems to have normalized as of today. Monitor closely for NIVIA s/p CT Angio chest.   - Avoid nephrotoxic agents
Currently back in NSR    Ordered TTE (pending)  already on AC for acute PE  TSH within normal limits
- likely ATN due to severe dehydration (present on admission)  - s/p IVF. Currently able to take in enough fluids orally.   - Cr is slowly improving.  - Avoid nephrotoxic agents
Again in Afib - rate controlled with cardizem   Ordered TTE (pending)  already on AC for acute PE  TSH within normal limits
LE duplex showed LLE DVT  - c/w Eliquis as above
LE duplex showed LLE posterior tibial peroneal trunk DVT  - c/w Eliquis as above
LE duplex showed LLE posterior tibial peroneal trunk DVT  - c/w Eliquis as above
Again in Afib - rate controlled with cardizem   Ordered TTE (pending)  already on AC for acute PE  TSH within normal limits
LE duplex showed LLE posterior tibial, peroneal trunk DVT  - c/w Eliquis as above
Again in Afib - rate controlled with cardizem   Ordered TTE (pending)  already on AC for acute PE  TSH within normal limits
LE duplex showed LLE DVT  - c/w Eliquis as above
LE duplex showed LLE DVT  - c/w Lovenox as above
Again in Afib - rate controlled with Cardizem. Normal TSH  - Ordered TTE (pending), cardiology f/u plan noted  - already on AC for acute PE

## 2021-04-05 NOTE — DISCHARGE NOTE PROVIDER - CARE PROVIDER_API CALL
Geovanny Crespo)  Internal Medicine  [address], [address]  [place] [address]  Phone: [phone]  Fax: [phone]  Follow Up Time: 2 weeks    Jaciel Pyle)  Cardiovascular Disease; Interventional Cardiology; Nuclear Cardiology  [address], [address]  [place] [address]  Phone: [phone]  Fax: [phone]  Follow Up Time: 1 week

## 2021-04-05 NOTE — PROGRESS NOTE ADULT - PROBLEM SELECTOR PROBLEM 1
Acute respiratory failure with hypoxia
Atrial fibrillation with RVR
Acute respiratory failure with hypoxia
Atrial fibrillation with RVR
Acute respiratory failure with hypoxia

## 2021-04-05 NOTE — PROGRESS NOTE ADULT - ASSESSMENT
Echo 3/30/21: EF 70%, nl lv sys fx    a/p     65M c hx MO, HTN, recent covid vaccine (2.5 weeks ago), pw 9 days sore throat, and 4 days sob.  Cardiac eval for new onset afib    1. New onset Afib   -likely in setting of Covid, PE  -s/p Lopressor IVP x2, Cardizem IVP x 1, Cardizem 30 mg PO, Cardizem gtt   -afib rates expected to be mildly elevated given increase in oxygen demand   -currently in NSR, rates stable   -c/w Cardizem as ordered   -ChadsVas 1: c/w Eliquis   -Echo noted w nl lv sys fx     2. Covid-19 -02 supp as needed  -CXR noted   -hsT neg, no acs on ekg   -s/p decadron,  remdesivir  -Echo as above   -mgmt per med     3. HTN  -bp stable  -cont cardizem as above     4. SIMEON  -cr stable   -f/u med    5. PE  -CT chest noted with Pulmonary embolism in the right lower lobar artery as described above. No evidence of RV strain.  -c/w Eliquis   -f/u per med     6. DVT   -le doppler noted with There is acute, echogenic thrombus within the left posterior tibial peroneal trunk in the calf  -AC per med -c/w Eliquis      dvt ppx  DCP

## 2021-04-05 NOTE — PROGRESS NOTE ADULT - PROBLEM SELECTOR PROBLEM 3
Acute pulmonary embolism
COVID-19
Acute pulmonary embolism
COVID-19
Acute pulmonary embolism
Acute pulmonary embolism

## 2021-04-05 NOTE — PROGRESS NOTE ADULT - PROBLEM SELECTOR PROBLEM 2
Acute respiratory failure with hypoxia
COVID-19
Acute respiratory failure with hypoxia
COVID-19

## 2021-04-05 NOTE — PROGRESS NOTE ADULT - PROBLEM SELECTOR PLAN 5
- likely ATN due to severe dehydration (present on admission)  - s/p IVF. Currently able to take in enough fluids orally.   - Cr normalized.  - Avoid nephrotoxic agents  -Hyperkalemia normalized without intervention. Continue to monitor for now.
- hold BP meds for now
- likely ATN due to severe dehydration (present on admission)  - s/p IVF. Currently able to take in enough fluids orally.   - Cr normalized.  - Avoid nephrotoxic agents
- hold BP meds for now
- likely ATN due to severe dehydration (present on admission)  - s/p IVF. Currently able to take in enough fluids orally.   - Cr normalized.  - Avoid nephrotoxic agents
Again in Afib - rate 80-120s. Normal TSH. TTE showed normal EF 70%, no RV strains(pending), cardiology f/u plan noted  - On Cardizem CD 240mg/d and Eliquis
Again in Afib - rate 80s. Normal TSH. TTE showed normal EF 70%, no RV strains  - cardiology f/u plan noted  - On Cardizem CD 240mg/d and Eliquis
Noted RVR last night, got extra dose of IV Cardizem per NP. Afib - rate 80s. Normal TSH. TTE showed normal EF 70%, no RV strains  - Cardiology plan noted, recommended to c/w Cardizem CD 240mg/d and Eliquis    outpatient f/u in a week
- likely ATN due to severe dehydration (present on admission)  - s/p IVF. Currently able to take in enough fluids orally.   - Cr normalized.  - Avoid nephrotoxic agents  -Hyperkalemia normalized without intervention. Continue to monitor for now.
Noted RVR last night, got extra dose of IV Cardizem per NP. Afib - rate 80s. Normal TSH. TTE showed normal EF 70%, no RV strains  - Spoke to cardiology, recommended to c/w Cardizem CD 240mg/d and Eliquis    outpatient f/u in a week
Again in Afib - rate 80-120s. Normal TSH. TTE showed normal EF 70%, no RV strains(pending),   - cardiology f/u plan noted  - On Cardizem CD 240mg/d and Eliquis
Again in Afib - rate controlled with Cardizem. Normal TSH  - Ordered TTE (pending), cardiology f/u plan noted  - already on AC for acute PE
Again in Afib - rate 80-120s. Normal TSH. TTE showed normal EF 70%, no RV strains(pending),   - cardiology f/u plan noted  - On Cardizem CD 240mg/d and Eliquis
Likely ATN due to severe dehydration (present on admission)  - s/p IVF. Currently able to take in enough fluids orally.   - Cr normalized.

## 2021-04-05 NOTE — DISCHARGE NOTE PROVIDER - NSDCCPCAREPLAN_GEN_ALL_CORE_FT
PRINCIPAL DISCHARGE DIAGNOSIS  Diagnosis: COVID-19  Assessment and Plan of Treatment: You tested positive for COVID 19.  You no longer require hospitalization.  Complete abx as recommended if prescribed for home. Quarantine yourself for 14 days  Please follow up with your PCP in 1-2 weeks -Call your Provider before hand to make then aware of your hospitalization   Take Tylenol for Fevers every 6 hours as needed- Do not exceed 4gm of Tylenol in a 24 hour period  Stay hydrated   WEAR A FACE MASK   Cover your cough and sneezes   Clean your hands often   Avoid sharing personal house hold items   Clean all high touch surfaces- everyday items like table tops , door knobs, cell phones etc   You should restrict activities outside your home except for getting medical care   Avoid using public transportation  Do not go to work, school, or Public areas   Monitor your oxygen saturation   Call [facility] at [phone]      SECONDARY DISCHARGE DIAGNOSES  Diagnosis: Afib  Assessment and Plan of Treatment: Atrial fibrillation is the most common heart rhythm problem & has the risk of stroke & heart attack  It helps if you control your blood pressure, not drink more than 1-2 alcohol drinks per day, cut down on caffeine, getting treatment for over active thyroid gland, & getting exercise  Call your doctor if you feel your heart racing or beating unusually, chest tightness or pain, lightheaded, faint, shortness of breath especially with exercise  It is important to take your heart medication as prescribed  You may be on anticoagulation which is very important to take as directed    Diagnosis: DVT (deep venous thrombosis)  Assessment and Plan of Treatment: Take your "blood thinners" as prescribed.  Walking is encouraged, increase activity as tolerated.  If you develop new leg pain, swelling, and/or redness contact your healthcare provider.  If you develop new chest pain with difficulty breathing, a rapid heart rate and/or a feeling of passing out call emergency medical services 911.    Diagnosis: Pulmonary embolus  Assessment and Plan of Treatment: Take your anticoagulation (lovenox, coumadin) as directed.  Follow up with your health care provider within one week. Call for appointment.  If you develop shortness of breath or if your shortness of breath worsens call your Health Care Provider or go to the Emergency Department.

## 2021-04-05 NOTE — PROGRESS NOTE ADULT - PROVIDER SPECIALTY LIST ADULT
Cardiology
Hospitalist
Hospitalist
Cardiology
Internal Medicine
Hospitalist
Internal Medicine
Hospitalist
Internal Medicine

## 2021-04-05 NOTE — DISCHARGE NOTE PROVIDER - EXTENDED VTE OTHER REASON FREE TEXT
Concerta 36 mg daily written with 3 new prescriptions.  Begin Ritalin 10 mg as needed for after school activities or homework.  3 prescriptions were written.   D/C on Eliquis

## 2021-04-05 NOTE — PROGRESS NOTE ADULT - PROBLEM SELECTOR PLAN 6
- hold BP meds for now
BP has been stable  - c/w Cardizem CD 240mg/d
- hold BP meds for now
BP has been stable  - c/w Cardizem CD 240mg/d
- hold BP meds for now. -Just on diltiazem.
BP has been stable  - c/w Cardizem CD 240mg/d
BP has been stable  - c/w Cardizem CD 240mg/d
- hold BP meds for now
Likely ATN due to severe dehydration (present on admission)  - s/p IVF. Currently able to take in enough fluids orally.   - Cr normalized.

## 2021-04-05 NOTE — DISCHARGE NOTE PROVIDER - CARE PROVIDERS DIRECT ADDRESSES
[email or web address]

## 2021-04-06 ENCOUNTER — TRANSCRIPTION ENCOUNTER (OUTPATIENT)
Age: 66
End: 2021-04-06

## 2021-04-21 DIAGNOSIS — Z23 ENCOUNTER FOR IMMUNIZATION: ICD-10-CM

## 2021-04-21 PROBLEM — I10 ESSENTIAL (PRIMARY) HYPERTENSION: Chronic | Status: ACTIVE | Noted: 2021-03-21

## 2021-04-22 ENCOUNTER — NON-APPOINTMENT (OUTPATIENT)
Age: 66
End: 2021-04-22

## 2021-04-29 ENCOUNTER — NON-APPOINTMENT (OUTPATIENT)
Age: 66
End: 2021-04-29

## 2021-04-29 ENCOUNTER — APPOINTMENT (OUTPATIENT)
Dept: CARDIOLOGY | Facility: CLINIC | Age: 66
End: 2021-04-29
Payer: MEDICAID

## 2021-04-29 VITALS — DIASTOLIC BLOOD PRESSURE: 88 MMHG | SYSTOLIC BLOOD PRESSURE: 130 MMHG

## 2021-04-29 VITALS
OXYGEN SATURATION: 95 % | HEART RATE: 85 BPM | SYSTOLIC BLOOD PRESSURE: 170 MMHG | TEMPERATURE: 97.8 F | DIASTOLIC BLOOD PRESSURE: 90 MMHG | BODY MASS INDEX: 37.34 KG/M2 | WEIGHT: 264 LBS

## 2021-04-29 PROCEDURE — 99072 ADDL SUPL MATRL&STAF TM PHE: CPT

## 2021-04-29 PROCEDURE — 99205 OFFICE O/P NEW HI 60 MIN: CPT

## 2021-04-29 PROCEDURE — 93000 ELECTROCARDIOGRAM COMPLETE: CPT

## 2021-04-29 RX ORDER — LOSARTAN POTASSIUM AND HYDROCHLOROTHIAZIDE 12.5; 1 MG/1; MG/1
100-12.5 TABLET ORAL DAILY
Qty: 90 | Refills: 3 | Status: DISCONTINUED | COMMUNITY
Start: 2018-02-06 | End: 2021-04-29

## 2021-04-29 NOTE — HISTORY OF PRESENT ILLNESS
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  On  he was admitted to [place] with marked fatigue, striking exertional dyspnea ("could not walk 5 steps") and mild loss of sense of taste with COVID-19 infection.  CT scan revealed groundglass appearance consistent with multi lobar pneumonia in all lobes.  He was found to have a left posterior tibial trunk calf DVT and acute pulmonary embolus in the right lower lobe pulmonary artery.  Echo Doppler was essentially normal with left atrial size of 4.3 cm and no evidence of right heart strain.  His course was complicated by, without symptoms, atrial fibrillation with a rapid ventricular rate.  ECGs also revealed 1 to 2 mm ST segment depressions in V1 V3 during the period of rapid ventricular rate.  He was treated with p.o. Cardizem and converted after his rate was controlled.\par \par His treatment included O2, anticoagulation, dexamethasone and remdesivir.\par \par He feels that he has made significant progress since discharge.  Using supplemental oxygen as needed.  All saturations have been greater than 90% and usually 96%.  Breathing is substantially better.  No c/o chest, throat,jaw, arm or upper back discomfort.  No orthopnea or PND.  No palpitations, dizziness or syncope.  No edema or claudication.\par No anticoagulation related bleeding problems.\par \par \par There is no history of rheumatic fever, murmur or thyroid disorder or dyslipidemia.\par \par He is single and lives with and cares for his 91-year-old father who functions well.  He has a home in [place].  Never smoker and alcohol intake is social/weekends only when he is [facility].  His mother had dependent diabetes and  of a cardiac condition at age 53.  He has 4 siblings, 1 of whom has COPD.

## 2021-04-30 ENCOUNTER — APPOINTMENT (OUTPATIENT)
Dept: INTERNAL MEDICINE | Facility: CLINIC | Age: 66
End: 2021-04-30
Payer: MEDICAID

## 2021-04-30 VITALS
BODY MASS INDEX: 37.37 KG/M2 | OXYGEN SATURATION: 93 % | SYSTOLIC BLOOD PRESSURE: 120 MMHG | TEMPERATURE: 97.6 F | HEIGHT: 70.5 IN | DIASTOLIC BLOOD PRESSURE: 64 MMHG | HEART RATE: 90 BPM | WEIGHT: 264 LBS

## 2021-04-30 PROCEDURE — 99072 ADDL SUPL MATRL&STAF TM PHE: CPT

## 2021-04-30 PROCEDURE — 99214 OFFICE O/P EST MOD 30 MIN: CPT

## 2021-04-30 RX ORDER — DILTIAZEM HYDROCHLORIDE 240 MG/1
240 CAPSULE, EXTENDED RELEASE ORAL
Qty: 30 | Refills: 0 | Status: DISCONTINUED | COMMUNITY
Start: 2021-04-05

## 2021-04-30 RX ORDER — FLUTICASONE PROPIONATE 50 UG/1
50 SPRAY, METERED NASAL DAILY
Qty: 1 | Refills: 1 | Status: DISCONTINUED | COMMUNITY
Start: 2018-02-21 | End: 2021-04-30

## 2021-04-30 NOTE — ASSESSMENT
[FreeTextEntry1] : discussed w pt \par \par reviewed hospitalization , treatment and current rx after severe COVID infection complicated by multilobar pneumonia. continues home O2 only at night. \par cont to monitor pulse oximetry. gradually increase activities as tolerated \par \par discussed afib ,rate control and anticoagulation. cont current rx and for tx of DVT/PE. \par \par f/u w Dr Magallanes as scheduled \par \par will of course defer any consideration of second COVID vaccine \par \par reviewed labs from hospitalization. noted leukocytosis, likely related to infection and corticosteroid use. will plan for repeat labs in 6 weeks at next visit - monitor Hgba1c, WBc, PSA, etc \par \par call prn if any new concerns prior to next visit

## 2021-04-30 NOTE — REVIEW OF SYSTEMS
[Dyspnea on Exertion] : dyspnea on exertion [Negative] : Heme/Lymph [Fever] : no fever [Chills] : no chills [Chest Pain] : no chest pain [Palpitations] : no palpitations [Lower Ext Edema] : no lower extremity edema [Orthopena] : no orthopnea [Paroxysmal Nocturnal Dyspnea] : no paroxysmal nocturnal dyspnea [Wheezing] : no wheezing [Cough] : no cough

## 2021-04-30 NOTE — HISTORY OF PRESENT ILLNESS
[de-identified] : presents for f/u visit after hospitalization for severe COVID infection complicated by multilobar COVID pneumonia last month. he had initial vaccine on 3/3/21. hospitalized w severe dyspnea, founds to have pneumonia along w evidence of DVT and PE in conjunction w COVID infection. hospitalization reviewed. treated w anticoagulation, IV steroids, remdesivir. he developed afib during hospitalization, treated w rate control. symptoms improved gradually. he was discharged home w portable O2 which he uses currently only at night which is effective. he is monitoring pulse oximetry regularly. feels much improved compared to 1-2 weeks ago, gradually returning to activities. \par currently on Eliquis and diltiazem. \par consult w Dr Magallanes cardiology noted. he is to cont current rx and likely will require indefinite anticoagulation. \par

## 2021-04-30 NOTE — PHYSICAL EXAM
[No Acute Distress] : no acute distress [Well-Appearing] : well-appearing [Normal Voice/Communication] : normal voice/communication [No JVD] : no jugular venous distention [No Lymphadenopathy] : no lymphadenopathy [Normal] : normal rate, regular rhythm, normal S1 and S2 and no murmur heard [No Carotid Bruits] : no carotid bruits [No Edema] : there was no peripheral edema [Speech Grossly Normal] : speech grossly normal [Normal Gait] : normal gait [Normal Affect] : the affect was normal [Alert and Oriented x3] : oriented to person, place, and time [Normal Mood] : the mood was normal [Normal Insight/Judgement] : insight and judgment were intact

## 2021-05-21 ENCOUNTER — TRANSCRIPTION ENCOUNTER (OUTPATIENT)
Age: 66
End: 2021-05-21

## 2021-06-10 ENCOUNTER — APPOINTMENT (OUTPATIENT)
Dept: INTERNAL MEDICINE | Facility: CLINIC | Age: 66
End: 2021-06-10
Payer: MEDICAID

## 2021-06-10 ENCOUNTER — NON-APPOINTMENT (OUTPATIENT)
Age: 66
End: 2021-06-10

## 2021-06-10 ENCOUNTER — APPOINTMENT (OUTPATIENT)
Dept: CARDIOLOGY | Facility: CLINIC | Age: 66
End: 2021-06-10
Payer: MEDICAID

## 2021-06-10 VITALS
WEIGHT: 261 LBS | BODY MASS INDEX: 51.24 KG/M2 | SYSTOLIC BLOOD PRESSURE: 118 MMHG | DIASTOLIC BLOOD PRESSURE: 86 MMHG | HEIGHT: 60 IN | HEART RATE: 88 BPM | TEMPERATURE: 98 F | OXYGEN SATURATION: 95 % | RESPIRATION RATE: 17 BRPM

## 2021-06-10 VITALS
HEIGHT: 60 IN | TEMPERATURE: 97.5 F | OXYGEN SATURATION: 97 % | WEIGHT: 259 LBS | DIASTOLIC BLOOD PRESSURE: 80 MMHG | HEART RATE: 28 BPM | SYSTOLIC BLOOD PRESSURE: 125 MMHG | BODY MASS INDEX: 50.85 KG/M2

## 2021-06-10 VITALS — SYSTOLIC BLOOD PRESSURE: 132 MMHG | DIASTOLIC BLOOD PRESSURE: 84 MMHG

## 2021-06-10 DIAGNOSIS — R97.20 ELEVATED PROSTATE, SPECIFIC ANTIGEN [PSA]: ICD-10-CM

## 2021-06-10 DIAGNOSIS — Z86.16 PERSONAL HISTORY OF COVID-19: ICD-10-CM

## 2021-06-10 PROCEDURE — 93000 ELECTROCARDIOGRAM COMPLETE: CPT

## 2021-06-10 PROCEDURE — 99214 OFFICE O/P EST MOD 30 MIN: CPT | Mod: 25

## 2021-06-10 PROCEDURE — 99214 OFFICE O/P EST MOD 30 MIN: CPT

## 2021-06-10 PROCEDURE — 36415 COLL VENOUS BLD VENIPUNCTURE: CPT

## 2021-06-10 NOTE — ASSESSMENT
[FreeTextEntry1] : discussed w pt \par \par reviewed f/u w Dr Magallanes earlier today, CV status stable \par \par he is improving clinically with no new concerns. not requiring supplemental O2 \par \par cont current rx\par \par discussed afib ,rate control and anticoagulation. cont current rx and for tx of DVT/PE. \par \par repeat full labs as below today to monitor Hgba1c, lipids, PSA, repeat WBC \par \par RTO 3 months for f/u or earlier prn if any new concerns

## 2021-06-10 NOTE — HISTORY OF PRESENT ILLNESS
[FreeTextEntry1] : He presents in scheduled follow-up reporting that he has been feeling well.  Feels that he is virtually back to his normal state of wellbeing.  All activities of daily living are well-tolerated.  Notice very mild dyspnea exclusively with walking and carrying heavy items.  No associated chest, throat or jaw discomfort.  Sleeps comfortably on 1-2 pillows.  No orthopnea or PND.  No palpitations, dizziness or syncope.  No edema or claudication.\par \par Reviewed blood pressure at home.  (Although he is not waiting 5 minutes before the first determination and is not checking paired readings).  Readings are typically 138/80.  Initial blood pressure here near simultaneous with checking his home cuff was 118/66.\par \par Anticoagulation related bleeding problems.

## 2021-06-10 NOTE — HISTORY OF PRESENT ILLNESS
[de-identified] : presents for f/u visit for review of his progress after hospitalization for severe COVID infection in 4/21,  complicated by multilobar pneumonia , DVT/PE and development of afib during hospitalization. \par he has made excellent progress in terms of his clinical status, he has no new concerns. has mild exertional dyspnea but very manageable for him. no longer requiring O2 at any time. continues anticoagulation. he is in good spirits. appetite normal. \par currently on Eliquis and diltiazem. \par followup w Dr Magallanes noted earlier today. \par \par hx of IFG/DM, PSA elevation in setting of BPH which had been monitored with urologist. he is due for repeat labs today

## 2021-09-09 ENCOUNTER — APPOINTMENT (OUTPATIENT)
Dept: CARDIOLOGY | Facility: CLINIC | Age: 66
End: 2021-09-09

## 2021-09-22 ENCOUNTER — APPOINTMENT (OUTPATIENT)
Dept: CARDIOLOGY | Facility: CLINIC | Age: 66
End: 2021-09-22

## 2021-09-23 ENCOUNTER — APPOINTMENT (OUTPATIENT)
Dept: INTERNAL MEDICINE | Facility: CLINIC | Age: 66
End: 2021-09-23

## 2021-09-23 LAB
ALBUMIN SERPL ELPH-MCNC: 4.7 G/DL
ALP BLD-CCNC: 58 U/L
ALT SERPL-CCNC: 11 U/L
ANION GAP SERPL CALC-SCNC: 14 MMOL/L
APPEARANCE: CLEAR
AST SERPL-CCNC: 21 U/L
BASOPHILS # BLD AUTO: 0.03 K/UL
BASOPHILS NFR BLD AUTO: 0.3 %
BILIRUB SERPL-MCNC: 0.4 MG/DL
BILIRUBIN URINE: NEGATIVE
BLOOD URINE: NEGATIVE
BUN SERPL-MCNC: 12 MG/DL
CALCIUM SERPL-MCNC: 10.1 MG/DL
CHLORIDE SERPL-SCNC: 101 MMOL/L
CHOLEST SERPL-MCNC: 210 MG/DL
CO2 SERPL-SCNC: 24 MMOL/L
COLOR: YELLOW
CREAT SERPL-MCNC: 1.11 MG/DL
EOSINOPHIL # BLD AUTO: 0.13 K/UL
EOSINOPHIL NFR BLD AUTO: 1.4 %
ESTIMATED AVERAGE GLUCOSE: 111 MG/DL
GLUCOSE QUALITATIVE U: NEGATIVE
GLUCOSE SERPL-MCNC: 100 MG/DL
HBA1C MFR BLD HPLC: 5.5 %
HCT VFR BLD CALC: 51.1 %
HDLC SERPL-MCNC: 42 MG/DL
HGB BLD-MCNC: 15.7 G/DL
IMM GRANULOCYTES NFR BLD AUTO: 0.2 %
KETONES URINE: NORMAL
LDLC SERPL CALC-MCNC: 147 MG/DL
LEUKOCYTE ESTERASE URINE: NEGATIVE
LYMPHOCYTES # BLD AUTO: 2.33 K/UL
LYMPHOCYTES NFR BLD AUTO: 24.5 %
MAN DIFF?: NORMAL
MCHC RBC-ENTMCNC: 28.7 PG
MCHC RBC-ENTMCNC: 30.7 GM/DL
MCV RBC AUTO: 93.4 FL
MONOCYTES # BLD AUTO: 0.69 K/UL
MONOCYTES NFR BLD AUTO: 7.3 %
NEUTROPHILS # BLD AUTO: 6.31 K/UL
NEUTROPHILS NFR BLD AUTO: 66.3 %
NITRITE URINE: NEGATIVE
NONHDLC SERPL-MCNC: 168 MG/DL
PH URINE: 6.5
PLATELET # BLD AUTO: 400 K/UL
POTASSIUM SERPL-SCNC: 4.6 MMOL/L
PROT SERPL-MCNC: 7.2 G/DL
PROTEIN URINE: NORMAL
PSA SERPL-MCNC: 7.35 NG/ML
RBC # BLD: 5.47 M/UL
RBC # FLD: 15.4 %
SODIUM SERPL-SCNC: 140 MMOL/L
SPECIFIC GRAVITY URINE: 1.02
T4 FREE SERPL-MCNC: 1.3 NG/DL
TRIGL SERPL-MCNC: 106 MG/DL
TSH SERPL-ACNC: 1.41 UIU/ML
UROBILINOGEN URINE: NORMAL
WBC # FLD AUTO: 9.51 K/UL

## 2022-05-04 ENCOUNTER — NON-APPOINTMENT (OUTPATIENT)
Age: 67
End: 2022-05-04

## 2022-05-06 ENCOUNTER — APPOINTMENT (OUTPATIENT)
Dept: CARDIOLOGY | Facility: CLINIC | Age: 67
End: 2022-05-06
Payer: MEDICARE

## 2022-05-06 ENCOUNTER — NON-APPOINTMENT (OUTPATIENT)
Age: 67
End: 2022-05-06

## 2022-05-06 PROCEDURE — 93000 ELECTROCARDIOGRAM COMPLETE: CPT

## 2022-05-06 NOTE — HISTORY OF PRESENT ILLNESS
[FreeTextEntry1] : He presents in scheduled follow-up after 1 month hiatus.  He feels that he "checks off the boxes" for "long-haul COVID with easy fatigability, exertional dyspnea (primarily with stairs) and fatigue.  Stair climb is also associated with a sense of "racing heart, but not like when I had fibrillation"..    No associated chest, throat or jaw discomfort.  Sleeps comfortably on 1 pillows.  No orthopnea or PND.  No palpitations, dizziness or syncope.  No edema or claudication.\par \par Not monitoring blood pressure at home.\par \par No anticoagulation related bleeding problems.

## 2022-05-08 LAB
ALBUMIN SERPL ELPH-MCNC: 4.4 G/DL
ALP BLD-CCNC: 72 U/L
ALT SERPL-CCNC: 23 U/L
ANION GAP SERPL CALC-SCNC: 13 MMOL/L
AST SERPL-CCNC: 29 U/L
BASOPHILS # BLD AUTO: 0.06 K/UL
BASOPHILS NFR BLD AUTO: 0.5 %
BILIRUB SERPL-MCNC: 0.4 MG/DL
BUN SERPL-MCNC: 21 MG/DL
CALCIUM SERPL-MCNC: 9.4 MG/DL
CHLORIDE SERPL-SCNC: 104 MMOL/L
CHOLEST SERPL-MCNC: 215 MG/DL
CO2 SERPL-SCNC: 22 MMOL/L
CREAT SERPL-MCNC: 1.21 MG/DL
EGFR: 66 ML/MIN/1.73M2
EOSINOPHIL # BLD AUTO: 0.15 K/UL
EOSINOPHIL NFR BLD AUTO: 1.3 %
ESTIMATED AVERAGE GLUCOSE: 143 MG/DL
GLUCOSE SERPL-MCNC: 90 MG/DL
HBA1C MFR BLD HPLC: 6.6 %
HCT VFR BLD CALC: 51 %
HDLC SERPL-MCNC: 42 MG/DL
HGB BLD-MCNC: 16.2 G/DL
IMM GRANULOCYTES NFR BLD AUTO: 0.3 %
LDLC SERPL CALC-MCNC: 149 MG/DL
LDLC SERPL DIRECT ASSAY-MCNC: 150 MG/DL
LYMPHOCYTES # BLD AUTO: 3.18 K/UL
LYMPHOCYTES NFR BLD AUTO: 28.2 %
MAN DIFF?: NORMAL
MCHC RBC-ENTMCNC: 28.1 PG
MCHC RBC-ENTMCNC: 31.8 GM/DL
MCV RBC AUTO: 88.5 FL
MONOCYTES # BLD AUTO: 1.02 K/UL
MONOCYTES NFR BLD AUTO: 9.1 %
NEUTROPHILS # BLD AUTO: 6.82 K/UL
NEUTROPHILS NFR BLD AUTO: 60.6 %
NONHDLC SERPL-MCNC: 173 MG/DL
PLATELET # BLD AUTO: 412 K/UL
POTASSIUM SERPL-SCNC: 5 MMOL/L
PROT SERPL-MCNC: 7 G/DL
RBC # BLD: 5.76 M/UL
RBC # FLD: 14.3 %
SODIUM SERPL-SCNC: 139 MMOL/L
T4 FREE SERPL-MCNC: 1.3 NG/DL
TRIGL SERPL-MCNC: 119 MG/DL
TSH SERPL-ACNC: 2.05 UIU/ML
WBC # FLD AUTO: 11.26 K/UL

## 2022-06-09 NOTE — PROGRESS NOTE ADULT - PROBLEM SELECTOR PLAN 3
-- Pediatric NPO instructions as follows: (or as per your Surgeon)  --Stop ALL solid food, milk,gum, candy (including vitamins) 8 hours before surgery/procedure time.  --The patient should be ENCOURAGED to drink water and carbohydrate-rich clear liquids (sports drinks, clear juices,pedialyte) until 2 hours prior to surgery/procedure time.  --NOTHING TO EAT OR DRINK 2 hours before to surgery/procedure time.  --If you are told to take medication on the morning of surgery, it may be taken with a sip of water.   --Instructed to avoid vitamins,supplements,aspirin and ibuprophen until after procedure    -- Arrival place and directions given - [facility]  -- Bathing with antibacterial/regular soap   -- Don't wear any jewelry or bring any valuables AM of surgery   -- No makeup or moisturizer to face   -- No perfume/cologne/aftershave, powder, lotions, creams       Patient's mother denies patient having any side effects or issues with anesthesia or sedation.      Patient's Mom:  Verbalized understanding.   Denied patient having fever over the past 2 weeks  Was given an arrival time of 0920 per surgeon's office  Will accompany patient to the hospital             
- Continue Remdesivir and decadron day 2  - monitor inflammatory markers   - a/c as above
likely due to COVID-19 infection  continue AC with lovenox 120mg sc BID for now  TTE pending
- Continue remdesivir and decadron day 2  - monitor inflammatory markers   - a/c as above
Likely due to COVID-19 infection. TTE showed normal EF 70%, no Right heart strains LE duplex showed LLE DVT.  - will switch to Eliquis 10mg bid for 7 days and then 5mg bid from Lovenox
Likely due to COVID-19 infection. TTE showed normal EF 70%, no Right heart strains LE duplex showed LLE posterior tibial peroneal trunk DVT.  - On Eliquis 10mg bid for 7 days and then 5mg bid   - Outpatient f/u with PCP/Pulmonary
Likely due to COVID-19 infection. TTE showed normal EF 70%, no Right heart strains LE duplex showed LLE posterior tibial peroneal trunk DVT.  - On Eliquis 10mg bid for 7 days and then 5mg bid   - Outpatient f/u with PCP/Pulmonary
likely due to COVID-19 infection  continue AC with lovenox 120mg sc BID for now  TTE pending
Likely due to COVID-19 infection. TTE showed normal EF 70%, no Right heart strains LE duplex showed LLE DVT.  - On Eliquis 10mg bid for 7 days and then 5mg bid   - Outpatient f/u with PCP/Pulmonary
likely due to COVID-19 infection  continue AC with lovenox 120mg sc BID for now  TTE pending  -Consider LE duplex to further eval clot burden.
Likely due to COVID-19 infection. TTE showed normal EF 70%, no Right heart strains LE duplex showed LLE DVT.  - On Eliquis 10mg bid for 7 days and then 5mg bid   - Outpatient f/u with PCP/Pulmonary
Likely due to COVID-19 infection  - continue AC with lovenox 120mg sc BID for now  - TTE, LE duplex to further eval clot burden.
Likely due to COVID-19 infection. TTE showed normal EF 70%, no Right heart strains LE duplex showed LLE posterior tibial peroneal trunk DVT.  - On Eliquis 10mg bid for 7 days and then 5mg bid   - Outpatient f/u with PCP/Pulmonary
Likely due to COVID-19 infection  - TTE showed normal EF 70%, no Right heart strains     LE duplex showed LLE DVT.  - continue AC with lovenox 120mg sc BID for now
likely due to COVID-19 infection  continue AC with lovenox 120mg sc BID for now  TTE pending

## 2022-06-29 ENCOUNTER — APPOINTMENT (OUTPATIENT)
Dept: CARDIOLOGY | Facility: CLINIC | Age: 67
End: 2022-06-29

## 2022-06-29 VITALS
HEIGHT: 60 IN | HEART RATE: 89 BPM | SYSTOLIC BLOOD PRESSURE: 122 MMHG | BODY MASS INDEX: 56.93 KG/M2 | WEIGHT: 290 LBS | DIASTOLIC BLOOD PRESSURE: 78 MMHG | OXYGEN SATURATION: 95 %

## 2022-06-29 PROCEDURE — 93000 ELECTROCARDIOGRAM COMPLETE: CPT

## 2022-06-29 PROCEDURE — 93306 TTE W/DOPPLER COMPLETE: CPT

## 2022-06-29 PROCEDURE — 99214 OFFICE O/P EST MOD 30 MIN: CPT

## 2022-06-29 RX ORDER — DILTIAZEM HYDROCHLORIDE 240 MG/1
240 CAPSULE, EXTENDED RELEASE ORAL
Qty: 90 | Refills: 3 | Status: DISCONTINUED | COMMUNITY
Start: 2021-04-21 | End: 2022-06-29

## 2022-06-29 NOTE — HISTORY OF PRESENT ILLNESS
[FreeTextEntry1] : He presents in scheduled follow-up reporting that he has been feeling generally well.  Unchanged mild dyspnea with more than usual effort activities such as bending and gardening.  No associated chest, throat, jaw or arm discomfort, does simultaneously experience low back pain radiating to his calves.   No associated chest, throat or jaw discomfort.  Sleeps comfortably on 1 pillows.  No orthopnea or PND.  No palpitations, dizziness or syncope.  No edema or claudication.\par \par \par No anticoagulation related bleeding problems.

## 2022-07-17 ENCOUNTER — NON-APPOINTMENT (OUTPATIENT)
Age: 67
End: 2022-07-17

## 2022-07-20 ENCOUNTER — NON-APPOINTMENT (OUTPATIENT)
Age: 67
End: 2022-07-20

## 2022-07-20 ENCOUNTER — APPOINTMENT (OUTPATIENT)
Dept: ELECTROPHYSIOLOGY | Facility: CLINIC | Age: 67
End: 2022-07-20

## 2022-07-20 ENCOUNTER — TRANSCRIPTION ENCOUNTER (OUTPATIENT)
Age: 67
End: 2022-07-20

## 2022-07-20 VITALS — SYSTOLIC BLOOD PRESSURE: 144 MMHG | HEART RATE: 109 BPM | DIASTOLIC BLOOD PRESSURE: 88 MMHG

## 2022-07-20 DIAGNOSIS — J18.9 PNEUMONIA, UNSPECIFIED ORGANISM: ICD-10-CM

## 2022-07-20 PROCEDURE — 99203 OFFICE O/P NEW LOW 30 MIN: CPT | Mod: 25

## 2022-07-20 PROCEDURE — 93000 ELECTROCARDIOGRAM COMPLETE: CPT

## 2022-07-20 RX ORDER — COVID-19 MOLECULAR TEST ASSAY
KIT MISCELLANEOUS
Qty: 2 | Refills: 0 | Status: DISCONTINUED | COMMUNITY
Start: 2022-07-16

## 2022-07-20 NOTE — CARDIOLOGY SUMMARY
[de-identified] : 7/20/22: AF w/ RVR, rates 140bpm\par 5/6/2022: AF w/ rates 100bpm\par 6/10/2021: NSR\par  [de-identified] : 6/29/2022 - Normal LV and RV function, mild MR, mildly dilated left atrium

## 2022-07-20 NOTE — END OF VISIT
[FreeTextEntry3] : I, Dr. Javy Grace, personally performed the evaluation and management (E/M) services for this new patient.  That E/M includes conducting the initial examination, assessing all conditions, and establishing the plan of care.  Today, my fellow, Dr. Gerardo Saucedo, was here to observe my evaluation and management services for this patient to be followed going forward.\par \par \par

## 2022-07-20 NOTE — HISTORY OF PRESENT ILLNESS
[FreeTextEntry1] : Mr. Choudhary is a 67 year old male with a history of HTN, HLD, obesity, COVID PNA c/b DVT/PE and atrial fibrillation in 3/2021 here for first time appointment for management of his atrial fibrillation.  He was diagnosed with atrial fibrillation when he was hospitalized with COVID PNA in 3/2021, and since then has been seeing Dr. Titi Magallanes.  He was initiated on Cardizem 240mg daily and Eliquis 5mg BID. He reports chronic exertional dyspnea since his diagnosis of COVID, but ultimately does not feel when he is in atrial fibrillation.  Per chart review, he was in normal sinus rhythm on ECGs dating back to 6/2021, however recent ECGs 5/2022 and 7/2022 show he is in atrial fibrillation.  He is unsure of his heart rates at home.  He recently saw by Dr. Magallanes 1 month ago where his cardizem was increased to 300mg daily for elevated AF rates.  He was diagnosed with a pneumonia 1 week ago after going to an urgent care for mild acute worsening of his dyspnea w/ associated cough for which he is taking a 1 week antibiotic course.  In office today, he is in atrial fibrillation with rapid ventricular response and otherwise asymptomatic at rest.  He is compliant with all his medications.

## 2022-07-20 NOTE — PHYSICAL EXAM
[Well Developed] : well developed [No Acute Distress] : no acute distress [Obese] : obese [Normal Conjunctiva] : normal conjunctiva [Normal Venous Pressure] : normal venous pressure [No Carotid Bruit] : no carotid bruit [Normal S1, S2] : normal S1, S2 [No Murmur] : no murmur [No Rub] : no rub [No Gallop] : no gallop [Clear Lung Fields] : clear lung fields [Good Air Entry] : good air entry [No Respiratory Distress] : no respiratory distress  [Soft] : abdomen soft [Non Tender] : non-tender [No Masses/organomegaly] : no masses/organomegaly [Normal Bowel Sounds] : normal bowel sounds [Normal Gait] : normal gait [No Edema] : no edema [No Cyanosis] : no cyanosis [No Clubbing] : no clubbing [No Varicosities] : no varicosities [No Rash] : no rash [No Skin Lesions] : no skin lesions [Moves all extremities] : moves all extremities [No Focal Deficits] : no focal deficits [Normal Speech] : normal speech [Alert and Oriented] : alert and oriented [Normal memory] : normal memory

## 2022-08-08 ENCOUNTER — APPOINTMENT (OUTPATIENT)
Dept: CARDIOLOGY | Facility: CLINIC | Age: 67
End: 2022-08-08

## 2022-08-08 ENCOUNTER — NON-APPOINTMENT (OUTPATIENT)
Age: 67
End: 2022-08-08

## 2022-08-08 VITALS
DIASTOLIC BLOOD PRESSURE: 79 MMHG | OXYGEN SATURATION: 94 % | HEART RATE: 96 BPM | WEIGHT: 288 LBS | HEIGHT: 71 IN | RESPIRATION RATE: 17 BRPM | SYSTOLIC BLOOD PRESSURE: 122 MMHG | BODY MASS INDEX: 40.32 KG/M2

## 2022-08-08 PROCEDURE — 93000 ELECTROCARDIOGRAM COMPLETE: CPT

## 2022-08-08 PROCEDURE — 99214 OFFICE O/P EST MOD 30 MIN: CPT | Mod: 25

## 2022-08-08 NOTE — HISTORY OF PRESENT ILLNESS
Post-Op Assessment Note      CV Status:  Stable    Mental Status:  Alert    Hydration Status:  Stable    PONV Controlled:  None    Airway Patency:  Patent    Post Op Vitals Reviewed: Yes          Staff: CRNA           BP     Temp      Pulse     Resp     SpO2
[FreeTextEntry1] : He presents in scheduled follow-up reporting that he has been feeling okay".  Unchanged mild dyspnea with more than usual effort activities such as bending and gardening walking briskly or 1 flight..  No associated chest, throat, jaw or arm discomfort, does simultaneously experience low back pain radiating to his calves.   No associated chest, throat or jaw discomfort.  Sleeps comfortably on 1 pillow..  No orthopnea or PND.  Infrequent, mild, random sense of palpitations.  No dizziness or syncope.  No edema or claudication.\par \par \par No anticoagulation related bleeding problems.\par \par Met with Dr. Grace in consultation; ablation is scheduled for 9/5/2022.

## 2022-09-01 RX ORDER — AMOXICILLIN AND CLAVULANATE POTASSIUM 875; 125 MG/1; MG/1
875-125 TABLET, COATED ORAL
Qty: 20 | Refills: 0 | Status: DISCONTINUED | COMMUNITY
End: 2022-09-01

## 2022-09-01 RX ORDER — DOXYCYCLINE HYCLATE 100 MG/1
100 CAPSULE ORAL
Qty: 12 | Refills: 0 | Status: DISCONTINUED | COMMUNITY
End: 2022-09-01

## 2022-09-01 RX ORDER — CLOBETASOL PROPIONATE 0.5 MG/G
0.05 CREAM TOPICAL TWICE DAILY
Qty: 1 | Refills: 1 | Status: DISCONTINUED | COMMUNITY
Start: 2018-02-06 | End: 2022-09-01

## 2022-09-03 ENCOUNTER — OUTPATIENT (OUTPATIENT)
Dept: OUTPATIENT SERVICES | Facility: HOSPITAL | Age: 67
LOS: 1 days | End: 2022-09-03
Payer: COMMERCIAL

## 2022-09-03 DIAGNOSIS — Z11.52 ENCOUNTER FOR SCREENING FOR COVID-19: ICD-10-CM

## 2022-09-03 LAB — SARS-COV-2 RNA SPEC QL NAA+PROBE: SIGNIFICANT CHANGE UP

## 2022-09-03 PROCEDURE — U0005: CPT

## 2022-09-03 PROCEDURE — C9803: CPT

## 2022-09-03 PROCEDURE — U0003: CPT

## 2022-09-06 ENCOUNTER — TRANSCRIPTION ENCOUNTER (OUTPATIENT)
Age: 67
End: 2022-09-06

## 2022-09-06 ENCOUNTER — INPATIENT (INPATIENT)
Facility: HOSPITAL | Age: 67
LOS: 0 days | Discharge: HOME | DRG: 274 | End: 2022-09-06
Attending: INTERNAL MEDICINE | Admitting: INTERNAL MEDICINE
Payer: COMMERCIAL

## 2022-09-06 ENCOUNTER — APPOINTMENT (OUTPATIENT)
Dept: CV DIAGNOSITCS | Facility: HOSPITAL | Age: 67
End: 2022-09-06

## 2022-09-06 VITALS
TEMPERATURE: 98 F | WEIGHT: 294.98 LBS | SYSTOLIC BLOOD PRESSURE: 148 MMHG | HEIGHT: 71 IN | OXYGEN SATURATION: 98 % | HEART RATE: 96 BPM | DIASTOLIC BLOOD PRESSURE: 100 MMHG | RESPIRATION RATE: 16 BRPM

## 2022-09-06 VITALS — HEART RATE: 94 BPM

## 2022-09-06 DIAGNOSIS — I48.91 UNSPECIFIED ATRIAL FIBRILLATION: ICD-10-CM

## 2022-09-06 LAB
ALBUMIN SERPL ELPH-MCNC: 4.3 G/DL — SIGNIFICANT CHANGE UP (ref 3.3–5)
ALP SERPL-CCNC: 75 U/L — SIGNIFICANT CHANGE UP (ref 40–120)
ALT FLD-CCNC: 40 U/L — SIGNIFICANT CHANGE UP (ref 10–45)
ANION GAP SERPL CALC-SCNC: 15 MMOL/L — SIGNIFICANT CHANGE UP (ref 5–17)
APTT BLD: 33 SEC — SIGNIFICANT CHANGE UP (ref 27.5–35.5)
AST SERPL-CCNC: 37 U/L — SIGNIFICANT CHANGE UP (ref 10–40)
BILIRUB SERPL-MCNC: 0.5 MG/DL — SIGNIFICANT CHANGE UP (ref 0.2–1.2)
BLD GP AB SCN SERPL QL: NEGATIVE — SIGNIFICANT CHANGE UP
BUN SERPL-MCNC: 21 MG/DL — SIGNIFICANT CHANGE UP (ref 7–23)
CALCIUM SERPL-MCNC: 9.4 MG/DL — SIGNIFICANT CHANGE UP (ref 8.4–10.5)
CHLORIDE SERPL-SCNC: 103 MMOL/L — SIGNIFICANT CHANGE UP (ref 96–108)
CO2 SERPL-SCNC: 22 MMOL/L — SIGNIFICANT CHANGE UP (ref 22–31)
CREAT SERPL-MCNC: 1.09 MG/DL — SIGNIFICANT CHANGE UP (ref 0.5–1.3)
EGFR: 75 ML/MIN/1.73M2 — SIGNIFICANT CHANGE UP
GLUCOSE SERPL-MCNC: 151 MG/DL — HIGH (ref 70–99)
HCT VFR BLD CALC: 49.2 % — SIGNIFICANT CHANGE UP (ref 39–50)
HGB BLD-MCNC: 16.1 G/DL — SIGNIFICANT CHANGE UP (ref 13–17)
INR BLD: 1.14 RATIO — SIGNIFICANT CHANGE UP (ref 0.88–1.16)
MCHC RBC-ENTMCNC: 28.1 PG — SIGNIFICANT CHANGE UP (ref 27–34)
MCHC RBC-ENTMCNC: 32.7 GM/DL — SIGNIFICANT CHANGE UP (ref 32–36)
MCV RBC AUTO: 85.9 FL — SIGNIFICANT CHANGE UP (ref 80–100)
NRBC # BLD: 0 /100 WBCS — SIGNIFICANT CHANGE UP (ref 0–0)
PLATELET # BLD AUTO: 400 K/UL — SIGNIFICANT CHANGE UP (ref 150–400)
POTASSIUM SERPL-MCNC: 4.2 MMOL/L — SIGNIFICANT CHANGE UP (ref 3.5–5.3)
POTASSIUM SERPL-SCNC: 4.2 MMOL/L — SIGNIFICANT CHANGE UP (ref 3.5–5.3)
PROT SERPL-MCNC: 7.6 G/DL — SIGNIFICANT CHANGE UP (ref 6–8.3)
PROTHROM AB SERPL-ACNC: 13.1 SEC — SIGNIFICANT CHANGE UP (ref 10.5–13.4)
RBC # BLD: 5.73 M/UL — SIGNIFICANT CHANGE UP (ref 4.2–5.8)
RBC # FLD: 14.5 % — SIGNIFICANT CHANGE UP (ref 10.3–14.5)
RH IG SCN BLD-IMP: POSITIVE — SIGNIFICANT CHANGE UP
SODIUM SERPL-SCNC: 140 MMOL/L — SIGNIFICANT CHANGE UP (ref 135–145)
WBC # BLD: 13.55 K/UL — HIGH (ref 3.8–10.5)
WBC # FLD AUTO: 13.55 K/UL — HIGH (ref 3.8–10.5)

## 2022-09-06 PROCEDURE — C1732: CPT

## 2022-09-06 PROCEDURE — 93010 ELECTROCARDIOGRAM REPORT: CPT | Mod: 77

## 2022-09-06 PROCEDURE — 36415 COLL VENOUS BLD VENIPUNCTURE: CPT

## 2022-09-06 PROCEDURE — 85610 PROTHROMBIN TIME: CPT

## 2022-09-06 PROCEDURE — 93010 ELECTROCARDIOGRAM REPORT: CPT

## 2022-09-06 PROCEDURE — 93655 ICAR CATH ABLTJ DSCRT ARRHYT: CPT

## 2022-09-06 PROCEDURE — 86900 BLOOD TYPING SEROLOGIC ABO: CPT

## 2022-09-06 PROCEDURE — C1759: CPT

## 2022-09-06 PROCEDURE — 85730 THROMBOPLASTIN TIME PARTIAL: CPT

## 2022-09-06 PROCEDURE — 86901 BLOOD TYPING SEROLOGIC RH(D): CPT

## 2022-09-06 PROCEDURE — 80053 COMPREHEN METABOLIC PANEL: CPT

## 2022-09-06 PROCEDURE — 85027 COMPLETE CBC AUTOMATED: CPT

## 2022-09-06 PROCEDURE — 93623 PRGRMD STIMJ&PACG IV RX NFS: CPT | Mod: 26

## 2022-09-06 PROCEDURE — 93005 ELECTROCARDIOGRAM TRACING: CPT

## 2022-09-06 PROCEDURE — C1766: CPT

## 2022-09-06 PROCEDURE — 93656 COMPRE EP EVAL ABLTJ ATR FIB: CPT

## 2022-09-06 PROCEDURE — C1889: CPT

## 2022-09-06 PROCEDURE — C1730: CPT

## 2022-09-06 PROCEDURE — 86850 RBC ANTIBODY SCREEN: CPT

## 2022-09-06 PROCEDURE — C1894: CPT

## 2022-09-06 RX ORDER — SODIUM CHLORIDE 9 MG/ML
3 INJECTION INTRAMUSCULAR; INTRAVENOUS; SUBCUTANEOUS EVERY 8 HOURS
Refills: 0 | Status: DISCONTINUED | OUTPATIENT
Start: 2022-09-06 | End: 2022-09-06

## 2022-09-06 RX ORDER — DILTIAZEM HCL 120 MG
1 CAPSULE, EXT RELEASE 24 HR ORAL
Qty: 0 | Refills: 0 | DISCHARGE

## 2022-09-06 RX ORDER — DILTIAZEM HCL 120 MG
120 CAPSULE, EXT RELEASE 24 HR ORAL DAILY
Refills: 0 | Status: DISCONTINUED | OUTPATIENT
Start: 2022-09-06 | End: 2022-09-06

## 2022-09-06 RX ORDER — DILTIAZEM HCL 120 MG
1 CAPSULE, EXT RELEASE 24 HR ORAL
Qty: 30 | Refills: 0
Start: 2022-09-06 | End: 2022-10-05

## 2022-09-06 RX ORDER — ATORVASTATIN CALCIUM 80 MG/1
1 TABLET, FILM COATED ORAL
Qty: 0 | Refills: 0 | DISCHARGE

## 2022-09-06 RX ORDER — ATORVASTATIN CALCIUM 80 MG/1
20 TABLET, FILM COATED ORAL AT BEDTIME
Refills: 0 | Status: DISCONTINUED | OUTPATIENT
Start: 2022-09-06 | End: 2022-09-06

## 2022-09-06 RX ORDER — APIXABAN 2.5 MG/1
5 TABLET, FILM COATED ORAL EVERY 12 HOURS
Refills: 0 | Status: DISCONTINUED | OUTPATIENT
Start: 2022-09-06 | End: 2022-09-06

## 2022-09-06 RX ORDER — INFLUENZA VIRUS VACCINE 15; 15; 15; 15 UG/.5ML; UG/.5ML; UG/.5ML; UG/.5ML
0.7 SUSPENSION INTRAMUSCULAR ONCE
Refills: 0 | Status: DISCONTINUED | OUTPATIENT
Start: 2022-09-06 | End: 2022-09-06

## 2022-09-06 RX ADMIN — APIXABAN 5 MILLIGRAM(S): 2.5 TABLET, FILM COATED ORAL at 17:35

## 2022-09-06 NOTE — PATIENT PROFILE ADULT - FUNCTIONAL ASSESSMENT - DAILY ACTIVITY 1.
PICC Insertion Procedure Note    Consents confirmed, vessel patency confirmed with ultrasound. Risks and benefits of procedure explained to patient/family and education regarding central line associated bloodstream infections provided. Questions answered.     PICC placed in RUE per MD order with ultrasound guidance. 4 Fr, single lumen PICC placed in basilic vein after 1 attempt(s). 2.5 cc's of 1% lidocaine injected intradermally, 21 gauge microintroducer needle and modified Seldinger technique used. 46 cm total catheter length, 0 cm external measurement inserted with good blood return. Each lumen flushed without resistance with 10 mL 0.9% normal saline. PICC line secured with Biopatch and Tegaderm.    PICC tip location in the SVC confirmed by ECG technology. Pt tolerated procedure well.  Patient condition relayed to unit RN or ordering physician via this post procedure note in the EMR. RIJ central line discontinued after PICC insertion and CXR ordered to confirm placement.  Sleep Number Power PICC ref # [id number], Lot # [id number]   4 = No assist / stand by assistance

## 2022-09-06 NOTE — ASU DISCHARGE PLAN (ADULT/PEDIATRIC) - NS MD DC FALL RISK RISK
For information on Fall & Injury Prevention, visit: [email or web address] OR  [email or web address] OR  https://www.cdc.gov/steadi/patient.html

## 2022-09-06 NOTE — H&P CARDIOLOGY - HISTORY OF PRESENT ILLNESS
68 yo M with no implanted device with PMHx of morbid obesity, HTN, HLD, COVID PNA c/b DVT/PE and atrial fibrillation in 3/2021 presents for Afib ablation. pt reports he was diagnosed with atrial fibrillation when he was hospitalized with COVID PNA in 3/2021, and since then has been seeing Dr. Titi Magallanes, was on  Cardizem 240mg daily and Eliquis 5mg BID. He reports he feels palpitation, especially at night time with some LASSITER. Pt was evaluated by Dr. Grace and presents for ablation. Pt currently denies palpitation, SOB, dizziness or chest pain.     Per chart review, he was in normal sinus rhythm on ECGs dating back to 6/2021, However recent ECGs 5/2022 and 7/2022 show he is in atrial fibrillation. He is unsure of his heart rates at home. He recently saw by Dr. Magallanes 1 month ago where his cardizem was increased to 300mg daily for elevated AF rates.     Card: Latrell Magallanes              Pt reports poor appetite, 4 days of worsening SOB. Pt denies leg swelling or calf tenderness. No hx of blood clots. Denies CP, fevers, cough, diarrhea. Pt last had blood work 1 year ago, and states he was never told he had any kidney problems before. Reports urinating well, without blood or dysuria.    VS: Tm 98.3, P 88, BP 80/59, R 25, 84% RA  In the ED, received decadron, tylenol. 66 yo M with no implanted device with PMHx of morbid obesity, HTN, HLD, COVID PNA c/b DVT/PE and atrial fibrillation in 3/2021 presents for Afib ablation. pt reports he was diagnosed with atrial fibrillation when he was hospitalized with COVID PNA in 3/2021, and since then has been seeing Dr. Titi Magallanes, was on  Cardizem 240mg daily and Eliquis 5mg BID. He reports he feels palpitation, especially at night time with some LASSITRE. Pt was evaluated by Dr. Grace and presents for ablation. Pt currently denies palpitation, SOB, dizziness or chest pain.     Per chart review, he was in normal sinus rhythm on ECGs dating back to 6/2021, However recent ECGs 5/2022 and 7/2022 show he is in atrial fibrillation. He is unsure of his heart rates at home. He recently saw by Dr. Magallanes 1 month ago where his cardizem was increased to 300mg daily for elevated AF rates.     Card: Latrell Magallanes

## 2022-09-06 NOTE — PATIENT PROFILE ADULT - FALL HARM RISK - UNIVERSAL INTERVENTIONS
Bed in lowest position, wheels locked, appropriate side rails in place/Call bell, personal items and telephone in reach/Instruct patient to call for assistance before getting out of bed or chair/Non-slip footwear when patient is out of bed/[place] to call system/Physically safe environment - no spills, clutter or unnecessary equipment/Purposeful Proactive Rounding/Room/bathroom lighting operational, light cord in reach

## 2022-09-06 NOTE — H&P CARDIOLOGY - NSICDXFAMILYHX_GEN_ALL_CORE_FT
FAMILY HISTORY:  Father  Still living? Unknown  FH: type 2 diabetes, Age at diagnosis: Age Unknown    Mother  Still living? No  FH: heart failure, Age at diagnosis: Age Unknown  FH: type 2 diabetes, Age at diagnosis: Age Unknown

## 2022-09-06 NOTE — PATIENT PROFILE ADULT - FUNCTIONAL ASSESSMENT - BASIC MOBILITY SCORE.
Patient returned my call and stated that she had already seen her results. She is still having some issues with her neck and is wanting a referral to Dr. Cunha for some adjustments. What can be done for her?    Also are you okay with sending the RXs in. She said the ibuprofen she got from someone else but is in need of it.   24

## 2022-09-06 NOTE — ASU DISCHARGE PLAN (ADULT/PEDIATRIC) - ASU DC SPECIAL INSTRUCTIONSFT
Wound Care:   the day AFTER your procedure remove bandage GENTLY, and clean using  mild soap and warm water stream, pat dry. Leave the area OPEN to air. YOU MAY SHOWER 24 hour after procedure.   DO NOT apply lotions, creams, ointments, powder, perfumes to the puncture site.  DO NOT SOAK the site for 1 week (no tub bath, no swimming, etc.)  Check  your groin and /or wrist daily. A small amount of bruising and sourness are normal.     ACTIVITY: for 24 hours   - DO NOT DRIVE  - DO NOT make any important decisions or sign legal documents   - DO NOT operate heavy duty machineries   - you may resume sexual activity in 48 hours, unless otherwise instructed by your cardiologist     If your procedure was done through the WRIST: for the NEXT 3DAYS:  - avoid pushing, pulling, with that affected wrist   - avoid repeated movement of that hand and wrist (eg: typing, hammering)  - DO NOT LIFT anything more than 5 lbs     If your procedure was done through the GROIN: for the NEXT 5 DAYS  - Limit climbing stairs, DO NOT soak in bathtub or pool  - no strenuous activities, pushing, pulling, straining  - Do not lift anything 10lbs or heavier     MEDICATION:   Take your medications as explained (see discharge paperwork)   If you received a STENT, you will be taking antiplatelet medications to KEEP YOUR STENT OPEN (eg: Aspirin, Plavix, Brilinta, Effient, etc).  Take as prescribed, DO NOT STOP taking them without consulting with your cardiologist first.     Follow heart healthy diet recommended by your doctor, if you smoke STOP SMOKING (may call [phone] for center of tobacco control if you need assistance)     CALL your doctor to make appointment in 2 WEEKS     ***CALL YOUR DOCTOR***  if you experience: fever, chills, body aches, or severe pain, swelling, redness, heat or yellow discharge at incision site  If you experience Bleeding or excruciating pain at the procedural site, swelling (golf ball size) at your procedural site  If you experience CHEST PAIN  If you experience extremity numbness, tingling, temperature change (of your procedural site)   If you are unable to reach your doctor, you may contact:   -Cardiology Office at [facility] at [phone] or [facility] [phone]- [facility] [phone]

## 2022-09-06 NOTE — ASU DISCHARGE PLAN (ADULT/PEDIATRIC) - CARE PROVIDER_API CALL
Javy Grace)  Cardiac Electrophysiology; Cardiology  [address] [place] [address]  Phone: [phone]  Fax: ([phone]  Scheduled Appointment: 10/19/2022 08:00 AM

## 2022-09-06 NOTE — H&P CARDIOLOGY - NSICDXPASTMEDICALHX_GEN_ALL_CORE_FT
PAST MEDICAL HISTORY:  Afib     HLD (hyperlipidemia)     HTN (hypertension)     Morbid obesity     Pneumonia due to COVID-19 virus

## 2022-09-07 ENCOUNTER — NON-APPOINTMENT (OUTPATIENT)
Age: 67
End: 2022-09-07

## 2022-10-06 NOTE — ED PROVIDER NOTE - NS_EDPROVIDERDISPOUSERTYPE_ED_A_ED
Case Management Assessment & Discharge Planning Note    Patient name Augustine Santiago  Location [facility] [phone] MRN [id number]  : 1950 Date 10/6/2022       Current Admission Date: 10/5/2022  Current Admission Diagnosis:Primary osteoarthritis of one hip, left   Patient Active Problem List    Diagnosis Date Noted   • Stage 3b chronic kidney disease ([facility] ) 08/10/2022   • Peripheral arterial disease with history of revascularization ([place] [address] ) 2022   • Hypertension 2022   • Acquired absence of other left toe(s) ([place] [address] ) 2022   • Class 3 severe obesity without serious comorbidity with body mass index (BMI) of 40 0 to 44 9 in adult, unspecified obesity type ([place] [address] ) 2022   • Primary osteoarthritis of one hip, left 2022   • Acquired hypothyroidism 2022   • 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 ([place] [address] ) 2020   • Liver cirrhosis secondary to JOSEPH (nonalcoholic steatohepatitis) ([place] [address] ) 2019   • Diverticulitis 2019   • History of diverticulitis 2017      LOS (days): 0  Geometric Mean LOS (GMLOS) (days):   Days to GMLOS:     OBJECTIVE:              Current admission status: Outpatient Surgery       Preferred Pharmacy:   [facility], [address] [place] [address]  Phone: [phone] Fax: [phone]    [facility] [address] [place] [address]  Phone: [phone] Fax: [phone]    [facility], [address] [facility] [address]  Phone: [phone] Fax: [phone]    Primary Care Provider: SLY Varela    Primary Insurance: MEDICARE  Secondary Insurance: [facility]    ASSESSMENT:  [address], [address] Representative - Son   Primary Phone: [phone] (Mobile)  Home Phone: [phone]               Advance Directives  Does patient have a Health Care POA?: No  Was patient offered paperwork?: Yes (provided paperwork)  Does patient currently have a Health Care decision maker?:  (son, Pratibha Payne)  Does patient have Advance Directives?: No  Was patient offered paperwork?: Yes (provided paperwork)  Primary Contact: sonPratibha [phone]         Readmission Root Cause  30 Day Readmission: No    Patient Information  Admitted from[de-identified] Home  Mental Status: Alert  During Assessment patient was accompanied by: Not accompanied during assessment  Assessment information provided by[de-identified] Patient  Primary Caregiver: Self  What city do you live in?: [place] 6 entry access options   Select all that apply : Stairs  Number of steps to enter home : 9 (down)  Do the steps have railings?: Yes  Type of Current Residence: [address] [place] home  Upon entering residence, is there a bedroom on the main floor (no further steps)?: Yes  Upon entering residence, is there a bathroom on the main floor (no further steps)?: Yes  In the last 12 months, was there a time when you were not able to pay the mortgage or rent on time?: No  In the last 12 months, how many places have you lived?: 1  In the last 12 months, was there a time when you did not have a steady place to sleep or slept in a shelter (including now)?: No  Homeless/housing insecurity resource given?: N/A  Living Arrangements: Lives w/ Son    Activities of Daily Living Prior to Admission  Functional Status: Independent  Completes ADLs independently?: Yes  Ambulates independently?: Yes  Does patient use assisted devices?: Yes  Assisted Devices (DME) used: Straight Cane  Does patient currently own DME?: Yes  What DME does the patient currently own?: Straight Cane  Does patient have a history of Outpatient Therapy (PT/OT)?: No  Does the patient have a history of Short-Term Rehab?: No  Does patient have a history of HHC?: No  Does patient currently have [place] [id number]?: No         Patient Information Continued  Income Source: Pension/FCI  Does patient have prescription coverage?: Yes  Within the past 12 months, you worried that your food would run out before you got the money to buy more : Never true  Within the past 12 months, the food you bought just didn't last and you didn't have money to get more : Never true  Food insecurity resource given?: N/A  Does patient receive dialysis treatments?: No  Does patient have a history of substance abuse?: No  Does patient have a history of Mental Health Diagnosis?: No         Means of Transportation  Means of Transport to Appts[de-identified] Drives Self  In the past 12 months, has lack of transportation kept you from medical appointments or from getting medications?: No  In the past 12 months, has lack of transportation kept you from meetings, work, or from getting things needed for daily living?: No  Was application for public transport provided?: N/A        DISCHARGE DETAILS:    Discharge planning discussed with[de-identified] patient        CM contacted family/caregiver?: No- see comments (declined)  Were Treatment Team discharge recommendations reviewed with patient/caregiver?: Yes  Did patient/caregiver verbalize understanding of patient care needs?: Yes       Contacts  Patient Contacts: sonMarguerite  Relationship to Patient[de-identified] Family  Contact Method: Phone  Phone Number: [phone]  Reason/Outcome: Emergency Contact         DME Referral Provided  Referral made for DME?: Yes  DME referral completed for the following items[de-identified] Bedside Commode  DME Supplier Name[de-identified] [employer]              Treatment Team Recommendation: Home, Other (outpt therapy)  Discharge Destination Plan[de-identified] Other, Home (outpt therapy)  Transport at Discharge : Family Attending Attestation (For Attendings USE Only)...

## 2022-10-19 ENCOUNTER — NON-APPOINTMENT (OUTPATIENT)
Age: 67
End: 2022-10-19

## 2022-10-19 ENCOUNTER — APPOINTMENT (OUTPATIENT)
Dept: ELECTROPHYSIOLOGY | Facility: CLINIC | Age: 67
End: 2022-10-19

## 2022-10-19 VITALS
WEIGHT: 300 LBS | HEIGHT: 71 IN | OXYGEN SATURATION: 96 % | BODY MASS INDEX: 42 KG/M2 | SYSTOLIC BLOOD PRESSURE: 125 MMHG | HEART RATE: 92 BPM | DIASTOLIC BLOOD PRESSURE: 82 MMHG

## 2022-10-19 PROBLEM — E78.5 HYPERLIPIDEMIA, UNSPECIFIED: Chronic | Status: ACTIVE | Noted: 2022-09-06

## 2022-10-19 PROBLEM — I48.91 UNSPECIFIED ATRIAL FIBRILLATION: Chronic | Status: ACTIVE | Noted: 2022-09-06

## 2022-10-19 PROBLEM — E66.01 MORBID (SEVERE) OBESITY DUE TO EXCESS CALORIES: Chronic | Status: ACTIVE | Noted: 2022-09-06

## 2022-10-19 PROBLEM — U07.1 COVID-19: Chronic | Status: ACTIVE | Noted: 2022-09-06

## 2022-10-19 PROCEDURE — 93000 ELECTROCARDIOGRAM COMPLETE: CPT

## 2022-10-19 PROCEDURE — 99214 OFFICE O/P EST MOD 30 MIN: CPT | Mod: 25

## 2022-10-19 RX ORDER — DILTIAZEM HYDROCHLORIDE 120 MG/1
120 CAPSULE, EXTENDED RELEASE ORAL
Qty: 30 | Refills: 0 | Status: DISCONTINUED | COMMUNITY
Start: 2022-09-06

## 2022-10-19 RX ORDER — DILTIAZEM HYDROCHLORIDE 300 MG/1
300 CAPSULE, EXTENDED RELEASE ORAL
Qty: 90 | Refills: 0 | Status: DISCONTINUED | COMMUNITY
Start: 2022-05-06

## 2022-10-20 NOTE — DISCUSSION/SUMMARY
[EKG obtained to assist in diagnosis and management of assessed problem(s)] : EKG obtained to assist in diagnosis and management of assessed problem(s) [FreeTextEntry1] : In summary, this is a  67 year old male with a history of HTN, HLD, obesity, COVID PNA c/b DVT/PE and persistent atrial fibrillation now status post ablation.  I am pleased to see him doing well and maintaining sinus rhythm.  He will be sent an event monitor at the end of the blanking period in 2 months and will follow-up for reevaluation in 6 months.  He knows to call me in the interim for further arrhythmic issues.\par \par Mr. Sánchez appeared to understand the whole discussion and verbalized that all of his questions were answered to his satisfaction.\par \par Thank you for allowing me to be involved in the care of this pleasant man. Please feel free to contact me with any questions.\par \par  \par \par Javy Grace MD\[facility]  of Cardiology\par Electrophysiology Section\[address], [address] [place] [address]\[facility] Office: [phone]\[facility] Fax: [phone]

## 2022-10-20 NOTE — HISTORY OF PRESENT ILLNESS
[FreeTextEntry1] : Mr. Choudhary is a 67 year old male with a history of HTN, HLD, obesity, COVID PNA c/b DVT/PE and symptomatic persistent atrial fibrillation.  On 9/6/2022 he presented for elective atrial fibrillation ablation.  Pulmonary vein isolation and cavotricuspid isthmus ablation was performed with return to sinus rhythm.  Voltage mapping following PVI showed healthy left atrial substrate and no extrapulmonary vein triggers were identified.\par \par He returns now for follow-up  He notes no chest pain, unexplained fevers, dysphagia or palpitations.  He notes normal access site healing.

## 2022-10-20 NOTE — CARDIOLOGY SUMMARY
[de-identified] : 7/20/22: AF w/ RVR, rates 140bpm\par 5/6/2022: AF w/ rates 100bpm\par 6/10/2021: NSR\par  [de-identified] : 6/29/2022 - Normal LV and RV function, mild MR, mildly dilated left atrium

## 2022-11-07 ENCOUNTER — APPOINTMENT (OUTPATIENT)
Dept: CARDIOLOGY | Facility: CLINIC | Age: 67
End: 2022-11-07

## 2022-11-07 ENCOUNTER — NON-APPOINTMENT (OUTPATIENT)
Age: 67
End: 2022-11-07

## 2022-11-07 VITALS
OXYGEN SATURATION: 96 % | WEIGHT: 282 LBS | DIASTOLIC BLOOD PRESSURE: 98 MMHG | HEART RATE: 81 BPM | BODY MASS INDEX: 39.48 KG/M2 | SYSTOLIC BLOOD PRESSURE: 150 MMHG | HEIGHT: 71 IN

## 2022-11-07 VITALS — DIASTOLIC BLOOD PRESSURE: 98 MMHG | SYSTOLIC BLOOD PRESSURE: 132 MMHG

## 2022-11-07 PROCEDURE — 99214 OFFICE O/P EST MOD 30 MIN: CPT | Mod: 25

## 2022-11-07 PROCEDURE — 93000 ELECTROCARDIOGRAM COMPLETE: CPT

## 2022-11-07 NOTE — HISTORY OF PRESENT ILLNESS
[FreeTextEntry1] : He presents in scheduled follow-up reporting that he has been feeling well.  Ablation was uncomplicated and he has not been experiencing any palpitations.  Moderately active and tolerates all ADL without any effort.provoked symptoms.  No c/o chest, throat,jaw, arm or upper back discomfort.  No dyspnea, orthopnea or PND.  No palpitations, dizziness or syncope.  No edema or claudication.\par \par No anticoagulation related bleeding problems.\par \par Not ideally compliant with sodium restriction.

## 2023-02-06 ENCOUNTER — APPOINTMENT (OUTPATIENT)
Dept: CARDIOLOGY | Facility: CLINIC | Age: 68
End: 2023-02-06

## 2023-04-07 ENCOUNTER — NON-APPOINTMENT (OUTPATIENT)
Age: 68
End: 2023-04-07

## 2023-04-07 ENCOUNTER — APPOINTMENT (OUTPATIENT)
Dept: ELECTROPHYSIOLOGY | Facility: CLINIC | Age: 68
End: 2023-04-07
Payer: MEDICARE

## 2023-04-07 PROCEDURE — 93248 EXT ECG>7D<15D REV&INTERPJ: CPT | Mod: NC

## 2023-04-09 ENCOUNTER — FORM ENCOUNTER (OUTPATIENT)
Age: 68
End: 2023-04-09

## 2023-05-05 PROCEDURE — 93248 EXT ECG>7D<15D REV&INTERPJ: CPT

## 2023-05-24 ENCOUNTER — NON-APPOINTMENT (OUTPATIENT)
Age: 68
End: 2023-05-24

## 2023-05-24 ENCOUNTER — APPOINTMENT (OUTPATIENT)
Dept: ELECTROPHYSIOLOGY | Facility: CLINIC | Age: 68
End: 2023-05-24
Payer: MEDICARE

## 2023-05-24 VITALS
HEART RATE: 86 BPM | DIASTOLIC BLOOD PRESSURE: 82 MMHG | HEIGHT: 71 IN | OXYGEN SATURATION: 97 % | WEIGHT: 307 LBS | SYSTOLIC BLOOD PRESSURE: 147 MMHG | BODY MASS INDEX: 42.98 KG/M2

## 2023-05-24 PROCEDURE — 99214 OFFICE O/P EST MOD 30 MIN: CPT | Mod: 25

## 2023-05-24 PROCEDURE — 93000 ELECTROCARDIOGRAM COMPLETE: CPT

## 2023-05-25 ENCOUNTER — APPOINTMENT (OUTPATIENT)
Dept: CARDIOLOGY | Facility: CLINIC | Age: 68
End: 2023-05-25
Payer: MEDICARE

## 2023-05-25 VITALS
DIASTOLIC BLOOD PRESSURE: 84 MMHG | SYSTOLIC BLOOD PRESSURE: 158 MMHG | OXYGEN SATURATION: 95 % | HEIGHT: 71 IN | HEART RATE: 98 BPM | BODY MASS INDEX: 42.98 KG/M2 | WEIGHT: 307 LBS

## 2023-05-25 PROCEDURE — 99214 OFFICE O/P EST MOD 30 MIN: CPT

## 2023-05-25 NOTE — HISTORY OF PRESENT ILLNESS
[FreeTextEntry1] : Mr. hCoudhary is a 68 year old male with a history of HTN, HLD, obesity, COVID PNA c/b DVT/PE and symptomatic persistent atrial fibrillation.  On 9/6/2022 he presented for elective atrial fibrillation ablation.  Pulmonary vein isolation and cavotricuspid isthmus ablation was performed with return to sinus rhythm.  Voltage mapping following PVI showed healthy left atrial substrate and no extrapulmonary vein triggers were identified.\par \par He returns now for follow-up event monitoring performed at the end of the blanking period showed episodes of paroxysmal atrial fibrillation occurring at less than 1% burden.  He reports that he was ill with a virus while wearing the monitor.  He notes no palpitations, chest pain, shortness of breath, dizziness or syncope.

## 2023-05-25 NOTE — CARDIOLOGY SUMMARY
[de-identified] : 5/25/2023: Normal sinus rhythm.\par 7/20/22: AF w/ RVR, rates 140bpm\par 5/6/2022: AF w/ rates 100bpm\par 6/10/2021: NSR\par  [de-identified] : 6/29/2022 - Normal LV and RV function, mild MR, mildly dilated left atrium

## 2023-05-25 NOTE — DISCUSSION/SUMMARY
[FreeTextEntry1] : In summary, this is a  68 year old male with a history of HTN, HLD, obesity, COVID PNA c/b DVT/PE and persistent atrial fibrillation now status post ablation.  I he had a low burden of recurrent atrial fibrillation seen on event monitoring after the blanking period; I thus advised he remain on anticoagulation for the time being with ongoing monitoring going forward.  We discussed the utility of external rhythm devices such as an Apple Watch or the Kardia for better assessment of A-fib burden going forward, though this may have been only occurring in the context of an acute illness.  He will follow-up in 6 months.\par \par Mr. Sánchez appeared to understand the whole discussion and verbalized that all of his questions were answered to his satisfaction.\par \par Thank you for allowing me to be involved in the care of this pleasant man. Please feel free to contact me with any questions.\par \par  \par \par Javy Grace MD\par  of Cardiology\par Electrophysiology Section\[address], [address] [place] [address]\[place] [address]\par Office: [phone]\par Fax: [phone]  [EKG obtained to assist in diagnosis and management of assessed problem(s)] : EKG obtained to assist in diagnosis and management of assessed problem(s)

## 2023-05-29 ENCOUNTER — NON-APPOINTMENT (OUTPATIENT)
Age: 68
End: 2023-05-29

## 2023-06-12 ENCOUNTER — APPOINTMENT (OUTPATIENT)
Dept: INTERNAL MEDICINE | Facility: CLINIC | Age: 68
End: 2023-06-12
Payer: MEDICARE

## 2023-06-12 VITALS
HEIGHT: 71 IN | TEMPERATURE: 97.6 F | WEIGHT: 304 LBS | SYSTOLIC BLOOD PRESSURE: 142 MMHG | OXYGEN SATURATION: 97 % | BODY MASS INDEX: 42.56 KG/M2 | DIASTOLIC BLOOD PRESSURE: 90 MMHG | HEART RATE: 101 BPM

## 2023-06-12 DIAGNOSIS — Z23 ENCOUNTER FOR IMMUNIZATION: ICD-10-CM

## 2023-06-12 DIAGNOSIS — Z00.00 ENCOUNTER FOR GENERAL ADULT MEDICAL EXAMINATION W/OUT ABNORMAL FINDINGS: ICD-10-CM

## 2023-06-12 PROCEDURE — G0009: CPT

## 2023-06-12 PROCEDURE — 36415 COLL VENOUS BLD VENIPUNCTURE: CPT

## 2023-06-12 PROCEDURE — 90677 PCV20 VACCINE IM: CPT

## 2023-06-12 PROCEDURE — G0439: CPT

## 2023-07-12 NOTE — PROGRESS NOTE ADULT - SUBJECTIVE AND OBJECTIVE BOX
Physical Therapy    Visit Type: initial evaluation    Relevant History/Co-morbidities: severe neuropathy at baseline; mod indep with 2WW, living alone    SUBJECTIVE  Patient agreed to participate in therapy this date.  Patient reports he's a little concerned about his mobility and feels weaker.  Patient / Family Goal: return home    Pain   Patient reports pain is not an issue/concern.     OBJECTIVE     Cognitive Status   Orientation    - Oriented to: person, place, time and situation  Functional Communication   - Forms of Communication: delayed responses  Attention Span    - Attention: intact  Following Direction   - follows all commands and directions consistently  Memory   - appears intact  Safety Awareness/Insight   - intact  Awareness of Deficits   - fully aware of deficits    Patient Activity Tolerance: 1 to 1 activity to rest      Range of Motion (ROM)   (degrees unless noted; active unless noted; norms in ( ); negative=lacking to 0, positive=beyond 0)  WFL: LLE, RLE  Comments: .    Strength  (out of 5 unless noted, standard test position unless noted)   WFL: LLE, RLE  Comments / Details: .      Sitting Balance  (DONTA = base of support)  Static      - Trial 1 details: independent  Dynamic      - Trial 1 details: modified independent    Standing Balance  (DONTA = base of support)  Firm Surface: Double Leg      - Static, Eyes Open       - Trial 1 details: supervision and with double UE support     - Dynamic, Eyes Open       - Trial 1 details: stand by assist and with double UE support  Use of walker for standing balance; able to complete functional reaching tasks with 1 UE support maintained and no loss of balance       Bed Mobility  - Supine to sit: minimal assist  - Sit to supine: modified independent  Completes log rolling, minimal assist at trunk to bring to edge of bed but returns to bed without physical assist  Transfers  Assistive devices: gait belt, 1 person, 2-wheeled walker  - Sit to stand: stand by  assist  - Stand to sit: stand by assist  - Stand pivot: stand by assist  Multiple attempts to rise, using momentum but no physical assist added     Ambulation / Gait  - Assistive device: gait belt, 1 person and two-wheeled walker  - Distance (feet unless otherwise indicated): 25, 25  - Assist Level: stand by assist  - Surface: even  - Description: shuffling and wide base of support  No overt loss of balance     Interventions     Training provided: bed mobility training, safety training, use of assistive device, functional ambulation, positioning, body mechanics, activity tolerance, compensatory techniques and balance retraining    Skilled input: Verbal instruction/cues and tactile instruction/cues  Verbal Consent: Writer verbally educated and received verbal consent for hand placement, positioning of patient, and techniques to be performed today from patient          Education:   - Present and ready to learn: patient  Education provided during session:  - Results of above outlined education: Verbalizes understanding and Needs reinforcement    ASSESSMENT   Patient will benefit from skilled therapy to address listed impairments and functional limitations.  Interferring components: decreased activity tolerance    Summary of function and discharge needs based on today's assessment:   - Current level of function: slightly below baseline level of function   - Therapy needs at discharge: therapy 1-3 times per week   - Activities of daily living (ADLs) requiring support at discharge: stairs   - Impairments that require further therapy intervention: activity tolerance and balance    AM-PAC  - Generalized Prior Level of Function: IND/MOD I ([facility] 22-24)       Key: MOD A=moderate assistance, IND/MOD I=independent/modified independent  - Generalized Current Level of Function     - Current Mobility Score: 17       AM-PAC Scoring Key= >21 Modified Independent; 20-21 Supervision; 18-19 Minimal assist; 13-18 Moderate assist; 9-12  Max assist; <9 Total assist  Patient presents with generalized deconditioning and weakness, resulting in increased time to complete all mobility and decreased tolerance for household level mobility.           • Predicted patient presentation: Low (stable) - Patient comorbidities and complexities, as defined above, will have little effect on progress for prescribed plan of care.    PLAN   Suggestions for next session as indicated: Bed mobility, repeated sit-stand for endurance (trial lower surface heights), household level ambulation with 2WW, standing HEP for strength/balance    PT Frequency: 3-5 x per week      PT/OT Mobility Equipment for Discharge: owns 2WW, no other equipment needs anticipated  PT/OT ADL Equipment for Discharge: Has shower chair; may benefit from LH dressing AE/sock aide    Visit # since seen by PT:  0  Interventions: balance, bed mobility, energy conservation, gait training, strengthening, safety education, HEP train/position, endurance training and functional transfer training  Agreement to plan and goals: patient agrees with goals and treatment plan        GOALS  Review Date: 7/19/2023  Long Term Goals: (to be met by time of discharge from hospital)  Sit to supine: Patient will complete sit to supine modified independent.  Supine to sit: Patient will complete supine to sit modified independent.  Sit to stand: Patient will complete sit to stand transfer with 2-wheeled walker, modified independent.   Stand to sit: Patient will complete stand to sit transfer with 2-wheeled walker, modified independent.   Stand pivot: Patient will complete stand pivot transfer with 2-wheeled walker, modified independent.   Ambulation (even): Patient will ambulate on even surface for 50 feet with 2-wheeled walker, modified independent.           Patient at End of Session:   Location: in bed  Safety measures: alarm system in place/re-engaged, call light within reach, equipment intact, bed rails x2 and lines  CARDIOLOGY FOLLOW UP - Dr. Pyle    CC no  acute events          PHYSICAL EXAM:  T(C): 36.6 (04-02-21 @ 12:08), Max: 36.8 (04-01-21 @ 20:31)  HR: 68 (04-02-21 @ 12:08) (68 - 81)  BP: 112/72 (04-02-21 @ 12:08) (102/68 - 119/80)  RR: 18 (04-02-21 @ 12:08) (17 - 20)  SpO2: 93% (04-02-21 @ 12:08) (81% - 94%)  Wt(kg): --  I&O's Summary    01 Apr 2021 07:01  -  02 Apr 2021 07:00  --------------------------------------------------------  IN: 840 mL / OUT: 0 mL / NET: 840 mL    02 Apr 2021 07:01  -  02 Apr 2021 14:00  --------------------------------------------------------  IN: 610 mL / OUT: 0 mL / NET: 610 mL        Home Medications:  Hyzaar 100 mg-12.5 mg oral tablet: 1 tab(s) orally once a day (22 Mar 2021 02:17)      MEDICATIONS  (STANDING):  apixaban 10 milliGRAM(s) Oral every 12 hours  diltiazem    milliGRAM(s) Oral daily  influenza   Vaccine 0.5 milliLiter(s) IntraMuscular once  melatonin 3 milliGRAM(s) Oral at bedtime      TELEMETRY: NSR -sinus tachycardia hr up 120s	    ECG:  	  RADIOLOGY:   DIAGNOSTIC TESTING:  [ ] Echocardiogram:  [ ]  Catheterization:  [ ] Stress Test:    OTHER: 	    LABS:	 	                            14.2   13.85 )-----------( 454      ( 02 Apr 2021 10:00 )             44.2     04-02    139  |  103  |  35<H>  ----------------------------<  88  4.2   |  26  |  1.11    Ca    8.7      02 Apr 2021 10:00    TPro  6.0  /  Alb  3.2<L>  /  TBili  0.4  /  DBili  x   /  AST  30  /  ALT  36  /  AlkPhos  41  04-01             intact  Handoff to: nurse and therapist      Therapy procedure time and total treatment time can be found documented on the Time Entry flowsheet

## 2023-08-21 ENCOUNTER — APPOINTMENT (OUTPATIENT)
Dept: INTERNAL MEDICINE | Facility: CLINIC | Age: 68
End: 2023-08-21

## 2023-09-06 ENCOUNTER — APPOINTMENT (OUTPATIENT)
Dept: INTERNAL MEDICINE | Facility: CLINIC | Age: 68
End: 2023-09-06
Payer: MEDICARE

## 2023-09-06 VITALS
HEART RATE: 96 BPM | DIASTOLIC BLOOD PRESSURE: 82 MMHG | SYSTOLIC BLOOD PRESSURE: 140 MMHG | WEIGHT: 313 LBS | HEIGHT: 71 IN | TEMPERATURE: 96.7 F | OXYGEN SATURATION: 97 % | BODY MASS INDEX: 43.82 KG/M2

## 2023-09-06 LAB
ALBUMIN SERPL ELPH-MCNC: 4.6 G/DL
ALP BLD-CCNC: 79 U/L
ALT SERPL-CCNC: 25 U/L
ANION GAP SERPL CALC-SCNC: 16 MMOL/L
APPEARANCE: CLEAR
AST SERPL-CCNC: 24 U/L
BILIRUB SERPL-MCNC: 0.6 MG/DL
BILIRUBIN URINE: NEGATIVE
BLOOD URINE: NEGATIVE
BUN SERPL-MCNC: 19 MG/DL
CALCIUM SERPL-MCNC: 9.6 MG/DL
CHLORIDE SERPL-SCNC: 101 MMOL/L
CHOLEST SERPL-MCNC: 130 MG/DL
CO2 SERPL-SCNC: 22 MMOL/L
COLOR: YELLOW
CREAT SERPL-MCNC: 1.13 MG/DL
EGFR: 72 ML/MIN/1.73M2
ESTIMATED AVERAGE GLUCOSE: 134 MG/DL
GLUCOSE QUALITATIVE U: NEGATIVE MG/DL
GLUCOSE SERPL-MCNC: 118 MG/DL
HBA1C MFR BLD HPLC: 6.3 %
HDLC SERPL-MCNC: 50 MG/DL
KETONES URINE: ABNORMAL MG/DL
LDLC SERPL CALC-MCNC: 52 MG/DL
LEUKOCYTE ESTERASE URINE: NEGATIVE
NITRITE URINE: NEGATIVE
NONHDLC SERPL-MCNC: 80 MG/DL
PH URINE: 5.5
POTASSIUM SERPL-SCNC: 4.5 MMOL/L
PROT SERPL-MCNC: 7.3 G/DL
PROTEIN URINE: NORMAL MG/DL
PSA SERPL-MCNC: 8.8 NG/ML
SODIUM SERPL-SCNC: 139 MMOL/L
SPECIFIC GRAVITY URINE: 1.03
T4 FREE SERPL-MCNC: 1.4 NG/DL
TRIGL SERPL-MCNC: 140 MG/DL
TSH SERPL-ACNC: 1.76 UIU/ML
UROBILINOGEN URINE: 0.2 MG/DL

## 2023-09-06 PROCEDURE — 99213 OFFICE O/P EST LOW 20 MIN: CPT | Mod: 25

## 2023-10-18 ENCOUNTER — RX RENEWAL (OUTPATIENT)
Age: 68
End: 2023-10-18

## 2023-12-06 ENCOUNTER — APPOINTMENT (OUTPATIENT)
Dept: CARDIOLOGY | Facility: CLINIC | Age: 68
End: 2023-12-06
Payer: MEDICARE

## 2023-12-06 ENCOUNTER — NON-APPOINTMENT (OUTPATIENT)
Age: 68
End: 2023-12-06

## 2023-12-06 VITALS
SYSTOLIC BLOOD PRESSURE: 150 MMHG | WEIGHT: 315 LBS | BODY MASS INDEX: 43.93 KG/M2 | DIASTOLIC BLOOD PRESSURE: 86 MMHG | HEART RATE: 89 BPM | OXYGEN SATURATION: 96 %

## 2023-12-06 PROCEDURE — 99214 OFFICE O/P EST MOD 30 MIN: CPT | Mod: 25

## 2023-12-06 PROCEDURE — 93000 ELECTROCARDIOGRAM COMPLETE: CPT

## 2023-12-13 ENCOUNTER — APPOINTMENT (OUTPATIENT)
Dept: INTERNAL MEDICINE | Facility: CLINIC | Age: 68
End: 2023-12-13
Payer: MEDICARE

## 2023-12-13 VITALS
WEIGHT: 315 LBS | HEART RATE: 91 BPM | SYSTOLIC BLOOD PRESSURE: 155 MMHG | DIASTOLIC BLOOD PRESSURE: 80 MMHG | HEIGHT: 71 IN | OXYGEN SATURATION: 99 % | BODY MASS INDEX: 44.1 KG/M2

## 2023-12-13 DIAGNOSIS — I10 ESSENTIAL (PRIMARY) HYPERTENSION: ICD-10-CM

## 2023-12-13 DIAGNOSIS — U07.1 COVID-19: ICD-10-CM

## 2023-12-13 DIAGNOSIS — E66.9 OBESITY, UNSPECIFIED: ICD-10-CM

## 2023-12-13 DIAGNOSIS — R73.01 IMPAIRED FASTING GLUCOSE: ICD-10-CM

## 2023-12-13 PROCEDURE — 90662 IIV NO PRSV INCREASED AG IM: CPT

## 2023-12-13 PROCEDURE — G0008: CPT

## 2023-12-13 PROCEDURE — 99214 OFFICE O/P EST MOD 30 MIN: CPT | Mod: 25

## 2023-12-18 NOTE — HISTORY OF PRESENT ILLNESS
[de-identified] : presents for f/u visit, review of chronic conditions, current rx.   reviewed updates w cardiology   s/p ablation of afib pulmonary emboli likely secondary to COVID illness obesity unchanged  IFG stable on diet control  BPH symptoms stable, monitoring PSA   following w cardiology

## 2023-12-18 NOTE — PHYSICAL EXAM
[No Acute Distress] : no acute distress [Well-Appearing] : well-appearing [Normal Voice/Communication] : normal voice/communication [Normal] : normal rate, regular rhythm, normal S1 and S2 and no murmur heard [No Edema] : there was no peripheral edema [Normal Gait] : normal gait [Alert and Oriented x3] : oriented to person, place, and time [Normal Mood] : the mood was normal

## 2023-12-18 NOTE — ASSESSMENT
[FreeTextEntry1] : discussed w pt  reviewed updated hx , current rx, most recent labs and f/u w specialists   labs from last month reviewed cont to monitor hgba1c, low carb intake advised , weight loss efforts  cardiology f/u   cont current rx  RTO 3-4 months or earlier prn

## 2023-12-18 NOTE — HISTORY OF PRESENT ILLNESS
[de-identified] : presents for f/u visit for review of updated hx, current rx, updates on chronic conditions.  feeling well currently, no new concerns.   hx of afib - sP ablation, with pulmonary emboli as complication following COVID19 illness. continuing anticoagulation w Eliquis  reviewed f/u w Dr Magallanes cardiology.  monitoring BPH w PSA elevation, due for f/u w urology  IFG on diet control, stable

## 2023-12-18 NOTE — PHYSICAL EXAM
[No Acute Distress] : no acute distress [Normal Voice/Communication] : normal voice/communication [Normal] : no carotid or abdominal bruits heard, no varicosities, pedal pulses are present, no peripheral edema, no extremity clubbing or cyanosis and no palpable aorta [Normal Gait] : normal gait [Speech Grossly Normal] : speech grossly normal [Alert and Oriented x3] : oriented to person, place, and time [Normal Mood] : the mood was normal

## 2023-12-18 NOTE — ASSESSMENT
[FreeTextEntry1] : discussed w pt  reviewed updated hx, current rx  reviewed cardiology f/u w Dr Magallanes, 2d echo   due for lab monitoring , he is not fasting today, he will have labs done fasting in the near future   monitor Hgba1c, advised on continued diet control, weight loss efforts  monitor PSA, in setting of BPH, advised urology f/u   influenza vaccine discussed and administered today  RTO 4 months for f/u or earlier prn if any new concerns

## 2024-04-15 RX ORDER — ATORVASTATIN CALCIUM 20 MG/1
20 TABLET, FILM COATED ORAL
Qty: 90 | Refills: 3 | Status: ACTIVE | COMMUNITY
Start: 2022-05-12 | End: 1900-01-01

## 2024-05-17 RX ORDER — DILTIAZEM HYDROCHLORIDE 180 MG/1
180 CAPSULE, EXTENDED RELEASE ORAL DAILY
Qty: 90 | Refills: 0 | Status: ACTIVE | COMMUNITY
Start: 2022-05-06 | End: 1900-01-01

## 2024-05-19 ENCOUNTER — NON-APPOINTMENT (OUTPATIENT)
Age: 69
End: 2024-05-19

## 2024-05-22 ENCOUNTER — NON-APPOINTMENT (OUTPATIENT)
Age: 69
End: 2024-05-22

## 2024-05-22 ENCOUNTER — APPOINTMENT (OUTPATIENT)
Dept: ELECTROPHYSIOLOGY | Facility: CLINIC | Age: 69
End: 2024-05-22
Payer: MEDICARE

## 2024-05-22 VITALS — DIASTOLIC BLOOD PRESSURE: 91 MMHG | SYSTOLIC BLOOD PRESSURE: 155 MMHG | HEART RATE: 84 BPM

## 2024-05-22 PROCEDURE — 99214 OFFICE O/P EST MOD 30 MIN: CPT | Mod: 25

## 2024-05-22 PROCEDURE — 93000 ELECTROCARDIOGRAM COMPLETE: CPT

## 2024-05-28 NOTE — PHYSICAL EXAM
[Well Developed] : well developed [Well Nourished] : well nourished [No Acute Distress] : no acute distress [Normal Venous Pressure] : normal venous pressure [No Carotid Bruit] : no carotid bruit [Normal S1, S2] : normal S1, S2 [No Murmur] : no murmur [No Rub] : no rub [No Gallop] : no gallop [Clear Lung Fields] : clear lung fields [Good Air Entry] : good air entry [No Respiratory Distress] : no respiratory distress  [Soft] : abdomen soft [Non Tender] : non-tender [Normal Bowel Sounds] : normal bowel sounds [Normal Gait] : normal gait [No Edema] : no edema [No Cyanosis] : no cyanosis [No Clubbing] : no clubbing [No Varicosities] : no varicosities [No Rash] : no rash [No Skin Lesions] : no skin lesions [Moves all extremities] : moves all extremities [No Focal Deficits] : no focal deficits [Normal Speech] : normal speech [Alert and Oriented] : alert and oriented

## 2024-05-29 NOTE — END OF VISIT
[FreeTextEntry3] : I, Dr. Javy Grace, personally performed the evaluation and management (E/M) services for this established patient who presents today with (a) new problem(s)/exacerbation of (an) existing condition(s).  That E/M includes conducting the examination, assessing all new/exacerbated conditions, and establishing a new plan of care.  Today my fellow, Dr. Tanner Olsen, was here to observe my evaluation and management services for this new problem/exacerbated condition to be followed going forward. [Time Spent: ___ minutes] : I have spent [unfilled] minutes of time on the encounter.

## 2024-05-29 NOTE — DISCUSSION/SUMMARY
[FreeTextEntry1] : In summary, this is a 69 year old male with a history of HTN, HLD, obesity, COVID PNA c/b DVT/PE and persistent atrial fibrillation now status post ablation. He is asymptomatic and overall has no complaints. He checks his heart rhythm randomly because he was unaware of the ability of his Apple watch to provide more frequent monitoring automatically. This feature was set up for him. The pathophysiology of atrial fibrillation and its association with obesity and sleep apnea were discussed. Weight loss was emphasized, and a sleep study was recommended. He will continue his anticoagulation as currently prescribed.   Mr. Sánchez appeared to understand the whole discussion and verbalized that all of his questions were answered to his satisfaction.  Thank you for allowing me to be involved in the care of this pleasant man. Please feel free to contact me with any questions [EKG obtained to assist in diagnosis and management of assessed problem(s)] : EKG obtained to assist in diagnosis and management of assessed problem(s)

## 2024-05-29 NOTE — HISTORY OF PRESENT ILLNESS
[FreeTextEntry1] : Mr. Choudhary is a 69 year old male with a history of HTN, HLD, obesity, COVID PNA c/b DVT/PE and symptomatic persistent atrial fibrillation. On 9/6/2022 he presented for elective atrial fibrillation ablation. Pulmonary vein isolation and cavotricuspid isthmus ablation were performed with return to sinus rhythm. Voltage mapping following PVI showed healthy left atrial substrate and no extrapulmonary vein triggers were identified. During follow-up, he was noted on event monitoring with episodes of paroxysmal atrial fibrillation occurring at less than 1% burden during the blanking period. He was continued on anticoagulation, which he is 100% compliant with. He denies further palpitations and denies any other complaints. He denies CP, dyspnea, presyncope, syncope, LE edema, or fatigue. He occasionally checks his heart rhythm with his Apple watch and has noted no arrhythmias during these checks.

## 2024-05-29 NOTE — CARDIOLOGY SUMMARY
[de-identified] : 5/22/2024: sinus rhythm [de-identified] : 3/30/2021: Observations: Mitral Valve: Normal mitral valve. Aortic Valve/Aorta: Calcified aortic valve with normal opening. Normal aortic root size. Left Atrium: Normal left atrium. Left Ventricle: Endocardial visualization enhanced with intravenous injection of Ultrasonic Enhancing Agent (Definity). Normal left ventricular internal dimensions and wall thicknesses. Normal left ventricular systolic function (EF: 70%). No segmental wall motion abnormalities. Right Heart: Normal right atrium. Normal right ventricular size and function. Normal tricuspid valve. Normal pulmonic valve. Minimal pulmonic regurgitation. Pericardium/Pleura: Normal pericardium with no pericardial effusion. Hemodynamic: No evidence of pulmonary hypertension. [de-identified] : 9/6/2022 Conclusions  Successful pulmonary vein isolation and cavotricuspid isthmus ablation for persistent atrial fibrillation.

## 2024-06-17 ENCOUNTER — APPOINTMENT (OUTPATIENT)
Dept: CARDIOLOGY | Facility: CLINIC | Age: 69
End: 2024-06-17
Payer: MEDICARE

## 2024-06-17 VITALS
HEART RATE: 86 BPM | HEIGHT: 71 IN | WEIGHT: 315 LBS | DIASTOLIC BLOOD PRESSURE: 90 MMHG | BODY MASS INDEX: 44.1 KG/M2 | SYSTOLIC BLOOD PRESSURE: 138 MMHG | OXYGEN SATURATION: 95 %

## 2024-06-17 PROCEDURE — 99214 OFFICE O/P EST MOD 30 MIN: CPT | Mod: 25

## 2024-06-17 PROCEDURE — 93306 TTE W/DOPPLER COMPLETE: CPT

## 2024-06-17 PROCEDURE — 93000 ELECTROCARDIOGRAM COMPLETE: CPT

## 2024-06-17 NOTE — HISTORY OF PRESENT ILLNESS
[FreeTextEntry1] : He presents in scheduled follow-up reporting that he has been feeling well.  Moderately active without any effort provoked symptoms other than mild dyspnea if he is up and down 2 flights without associated symptoms..  No c/o chest, throat,jaw, arm or upper back discomfort.  No dyspnea, orthopnea or PND.  No palpitations, dizziness or syncope.  No edema or claudication.  Dr. Grace discontinued Eliquis.

## 2024-06-18 ENCOUNTER — APPOINTMENT (OUTPATIENT)
Dept: INTERNAL MEDICINE | Facility: CLINIC | Age: 69
End: 2024-06-18

## 2024-06-25 ENCOUNTER — APPOINTMENT (OUTPATIENT)
Dept: INTERNAL MEDICINE | Facility: CLINIC | Age: 69
End: 2024-06-25

## 2024-06-25 VITALS
HEIGHT: 71 IN | TEMPERATURE: 97.3 F | HEART RATE: 92 BPM | DIASTOLIC BLOOD PRESSURE: 82 MMHG | BODY MASS INDEX: 44.1 KG/M2 | WEIGHT: 315 LBS | OXYGEN SATURATION: 96 % | SYSTOLIC BLOOD PRESSURE: 140 MMHG

## 2024-06-25 DIAGNOSIS — N13.8 BENIGN PROSTATIC HYPERPLASIA WITH LOWER URINARY TRACT SYMPMS: ICD-10-CM

## 2024-06-25 DIAGNOSIS — I48.91 UNSPECIFIED ATRIAL FIBRILLATION: ICD-10-CM

## 2024-06-25 DIAGNOSIS — I25.10 ATHEROSCLEROTIC HEART DISEASE OF NATIVE CORONARY ARTERY W/OUT ANGINA PECTORIS: ICD-10-CM

## 2024-06-25 DIAGNOSIS — I26.99 OTHER PULMONARY EMBOLISM W/OUT ACUTE COR PULMONALE: ICD-10-CM

## 2024-06-25 DIAGNOSIS — E78.5 HYPERLIPIDEMIA, UNSPECIFIED: ICD-10-CM

## 2024-06-25 DIAGNOSIS — N40.1 BENIGN PROSTATIC HYPERPLASIA WITH LOWER URINARY TRACT SYMPMS: ICD-10-CM

## 2024-06-25 DIAGNOSIS — I48.0 PAROXYSMAL ATRIAL FIBRILLATION: ICD-10-CM

## 2024-06-25 DIAGNOSIS — Z00.00 ENCOUNTER FOR GENERAL ADULT MEDICAL EXAMINATION W/OUT ABNORMAL FINDINGS: ICD-10-CM

## 2024-06-25 DIAGNOSIS — Z12.11 ENCOUNTER FOR SCREENING FOR MALIGNANT NEOPLASM OF COLON: ICD-10-CM

## 2024-06-25 DIAGNOSIS — I82.409 ACUTE EMBOLISM AND THROMBOSIS OF UNSPECIFIED DEEP VEINS OF UNSPECIFIED LOWER EXTREMITY: ICD-10-CM

## 2024-06-25 PROCEDURE — 36415 COLL VENOUS BLD VENIPUNCTURE: CPT

## 2024-06-25 PROCEDURE — G0439: CPT

## 2024-06-25 RX ORDER — APIXABAN 5 MG/1
5 TABLET, FILM COATED ORAL
Qty: 180 | Refills: 1 | Status: DISCONTINUED | COMMUNITY
Start: 2021-04-21 | End: 2024-06-25

## 2024-06-26 LAB
ALBUMIN SERPL ELPH-MCNC: 4.2 G/DL
ALP BLD-CCNC: 92 U/L
ALT SERPL-CCNC: 23 U/L
ANION GAP SERPL CALC-SCNC: 13 MMOL/L
APPEARANCE: CLEAR
AST SERPL-CCNC: 26 U/L
BACTERIA: NEGATIVE /HPF
BILIRUB SERPL-MCNC: 0.5 MG/DL
BILIRUBIN URINE: NEGATIVE
BLOOD URINE: NEGATIVE
BUN SERPL-MCNC: 16 MG/DL
CALCIUM SERPL-MCNC: 9.1 MG/DL
CAST: 0 /LPF
CHLORIDE SERPL-SCNC: 103 MMOL/L
CHOLEST SERPL-MCNC: 126 MG/DL
CO2 SERPL-SCNC: 24 MMOL/L
COLOR: YELLOW
CREAT SERPL-MCNC: 1.06 MG/DL
EGFR: 77 ML/MIN/1.73M2
EPITHELIAL CELLS: 0 /HPF
ESTIMATED AVERAGE GLUCOSE: 143 MG/DL
GLUCOSE QUALITATIVE U: NEGATIVE MG/DL
GLUCOSE SERPL-MCNC: 130 MG/DL
HBA1C MFR BLD HPLC: 6.6 %
HCT VFR BLD CALC: 44.5 %
HDLC SERPL-MCNC: 49 MG/DL
HGB BLD-MCNC: 13.7 G/DL
KETONES URINE: NEGATIVE MG/DL
LDLC SERPL CALC-MCNC: 56 MG/DL
LEUKOCYTE ESTERASE URINE: NEGATIVE
MCHC RBC-ENTMCNC: 26.4 PG
MCHC RBC-ENTMCNC: 30.8 GM/DL
MCV RBC AUTO: 85.7 FL
MICROSCOPIC-UA: NORMAL
NITRITE URINE: NEGATIVE
NONHDLC SERPL-MCNC: 77 MG/DL
PH URINE: 6.5
PLATELET # BLD AUTO: 377 K/UL
POTASSIUM SERPL-SCNC: 4.3 MMOL/L
PROT SERPL-MCNC: 6.7 G/DL
PROTEIN URINE: NEGATIVE MG/DL
PSA FREE FLD-MCNC: 22 %
PSA FREE SERPL-MCNC: 1.21 NG/ML
PSA SERPL-MCNC: 5.38 NG/ML
RBC # BLD: 5.19 M/UL
RBC # FLD: 14.3 %
RED BLOOD CELLS URINE: 1 /HPF
SODIUM SERPL-SCNC: 139 MMOL/L
SPECIFIC GRAVITY URINE: 1.02
TRIGL SERPL-MCNC: 119 MG/DL
TSH SERPL-ACNC: 2.57 UIU/ML
UROBILINOGEN URINE: 0.2 MG/DL
WBC # FLD AUTO: 8.93 K/UL
WHITE BLOOD CELLS URINE: 0 /HPF

## 2024-06-28 LAB — C TETANI IGG SER-ACNC: >7 IU/ML

## 2024-10-09 NOTE — HISTORY OF PRESENT ILLNESS
[FreeTextEntry1] : He presents in scheduled follow-up reporting that he has been feeling well.  Moderately active without any effort provoked symptoms other than mild, unchanged dyspnea without associated chest discomfort stair climbing.\par \par No c/o chest, throat,jaw, arm or upper back discomfort.  No orthopnea or PND.  No palpitations, dizziness or syncope.  No edema or claudication.\par \par No anticoagulation related bleeding problems.\par \par  normal/well-groomed/no distress

## 2024-12-16 ENCOUNTER — APPOINTMENT (OUTPATIENT)
Dept: CARDIOLOGY | Facility: CLINIC | Age: 69
End: 2024-12-16

## 2025-01-15 ENCOUNTER — TRANSCRIPTION ENCOUNTER (OUTPATIENT)
Age: 70
End: 2025-01-15

## 2025-04-01 ENCOUNTER — APPOINTMENT (OUTPATIENT)
Dept: CARDIOLOGY | Facility: CLINIC | Age: 70
End: 2025-04-01
Payer: MEDICARE

## 2025-04-01 VITALS — DIASTOLIC BLOOD PRESSURE: 92 MMHG | SYSTOLIC BLOOD PRESSURE: 128 MMHG

## 2025-04-01 VITALS
HEART RATE: 87 BPM | DIASTOLIC BLOOD PRESSURE: 82 MMHG | WEIGHT: 306 LBS | OXYGEN SATURATION: 95 % | SYSTOLIC BLOOD PRESSURE: 160 MMHG | BODY MASS INDEX: 42.68 KG/M2

## 2025-04-01 DIAGNOSIS — I48.0 PAROXYSMAL ATRIAL FIBRILLATION: ICD-10-CM

## 2025-04-01 DIAGNOSIS — I10 ESSENTIAL (PRIMARY) HYPERTENSION: ICD-10-CM

## 2025-04-01 PROCEDURE — 99214 OFFICE O/P EST MOD 30 MIN: CPT | Mod: 25

## 2025-04-01 PROCEDURE — 93000 ELECTROCARDIOGRAM COMPLETE: CPT

## 2025-04-01 RX ORDER — LOSARTAN POTASSIUM AND HYDROCHLOROTHIAZIDE 12.5; 5 MG/1; MG/1
50-12.5 TABLET ORAL
Qty: 90 | Refills: 3 | Status: ACTIVE | COMMUNITY
Start: 2025-04-01 | End: 1900-01-01

## 2025-05-05 ENCOUNTER — RX RENEWAL (OUTPATIENT)
Age: 70
End: 2025-05-05

## 2025-05-06 ENCOUNTER — RX RENEWAL (OUTPATIENT)
Age: 70
End: 2025-05-06

## 2025-05-06 RX ORDER — DILTIAZEM HYDROCHLORIDE 180 MG/1
180 CAPSULE, EXTENDED RELEASE ORAL
Qty: 90 | Refills: 0 | Status: ACTIVE | COMMUNITY
Start: 2025-05-06 | End: 1900-01-01

## 2025-07-01 ENCOUNTER — APPOINTMENT (OUTPATIENT)
Dept: CARDIOLOGY | Facility: CLINIC | Age: 70
End: 2025-07-01
Payer: MEDICARE

## 2025-07-01 VITALS
OXYGEN SATURATION: 94 % | BODY MASS INDEX: 43.52 KG/M2 | DIASTOLIC BLOOD PRESSURE: 80 MMHG | HEART RATE: 100 BPM | SYSTOLIC BLOOD PRESSURE: 130 MMHG | WEIGHT: 312 LBS

## 2025-07-01 PROCEDURE — 93000 ELECTROCARDIOGRAM COMPLETE: CPT

## 2025-07-01 PROCEDURE — 99214 OFFICE O/P EST MOD 30 MIN: CPT | Mod: 25

## 2025-08-06 ENCOUNTER — APPOINTMENT (OUTPATIENT)
Dept: ORTHOPEDIC SURGERY | Facility: CLINIC | Age: 70
End: 2025-08-06

## 2025-08-07 ENCOUNTER — RX RENEWAL (OUTPATIENT)
Age: 70
End: 2025-08-07